# Patient Record
Sex: FEMALE | Race: OTHER | NOT HISPANIC OR LATINO | URBAN - METROPOLITAN AREA
[De-identification: names, ages, dates, MRNs, and addresses within clinical notes are randomized per-mention and may not be internally consistent; named-entity substitution may affect disease eponyms.]

---

## 2023-09-14 PROBLEM — E78.5 HYPERLIPIDEMIA: Status: ACTIVE | Noted: 2023-09-14

## 2023-09-14 PROBLEM — M19.90 IDIOPATHIC OSTEOARTHRITIS: Status: ACTIVE | Noted: 2023-09-14

## 2023-09-14 PROBLEM — K21.9 GASTRO-ESOPHAGEAL REFLUX DISEASE WITHOUT ESOPHAGITIS: Status: ACTIVE | Noted: 2023-09-14

## 2023-09-14 PROBLEM — E78.6 LOW HDL (UNDER 40): Status: ACTIVE | Noted: 2023-09-14

## 2023-09-14 PROBLEM — O99.213 OBESITY COMPLICATING PREGNANCY, THIRD TRIMESTER (HHS-HCC): Status: ACTIVE | Noted: 2023-09-14

## 2023-09-14 PROBLEM — G47.30 SLEEP APNEA: Status: ACTIVE | Noted: 2023-09-14

## 2023-09-14 PROBLEM — I10 ESSENTIAL HYPERTENSION: Status: ACTIVE | Noted: 2023-09-14

## 2023-09-14 PROBLEM — N39.3 URINARY, INCONTINENCE, STRESS FEMALE: Status: ACTIVE | Noted: 2023-09-14

## 2023-09-14 PROBLEM — E55.9 VITAMIN D DEFICIENCY: Status: ACTIVE | Noted: 2023-09-14

## 2023-09-14 PROBLEM — G47.10 HYPERSOMNIA: Status: ACTIVE | Noted: 2023-09-14

## 2023-09-14 PROBLEM — F41.9 ANXIETY: Status: ACTIVE | Noted: 2023-09-14

## 2023-09-14 PROBLEM — E88.810 METABOLIC SYNDROME: Status: ACTIVE | Noted: 2023-09-14

## 2023-09-14 PROBLEM — E66.01 MORBID OBESITY (MULTI): Status: ACTIVE | Noted: 2023-09-14

## 2023-09-14 PROBLEM — N91.2 AMENORRHEA: Status: ACTIVE | Noted: 2023-09-14

## 2023-09-14 PROBLEM — O35.BXX0 ABNORMAL FETAL ECHOCARDIOGRAM AFFECTING ANTEPARTUM CARE OF MOTHER (HHS-HCC): Status: ACTIVE | Noted: 2023-09-14

## 2023-09-14 PROBLEM — N92.6 MENSTRUAL DISORDER: Status: ACTIVE | Noted: 2023-09-14

## 2023-09-14 PROBLEM — Z98.891 HISTORY OF CESAREAN SECTION: Status: ACTIVE | Noted: 2023-09-14

## 2023-09-14 RX ORDER — SEMAGLUTIDE 0.68 MG/ML
0.5 INJECTION, SOLUTION SUBCUTANEOUS
COMMUNITY
Start: 2023-05-02 | End: 2023-11-09 | Stop reason: SDUPTHER

## 2023-09-14 RX ORDER — ERGOCALCIFEROL 1.25 MG/1
50000 CAPSULE ORAL
COMMUNITY
End: 2023-11-09 | Stop reason: WASHOUT

## 2023-09-14 RX ORDER — ONDANSETRON 4 MG/1
4 TABLET, ORALLY DISINTEGRATING ORAL EVERY 6 HOURS PRN
COMMUNITY
End: 2023-11-09 | Stop reason: WASHOUT

## 2023-09-14 RX ORDER — OMEPRAZOLE 40 MG/1
40 CAPSULE, DELAYED RELEASE ORAL DAILY
COMMUNITY
End: 2023-11-09 | Stop reason: WASHOUT

## 2023-09-14 RX ORDER — FLUOXETINE 10 MG/1
1 CAPSULE ORAL DAILY
COMMUNITY
End: 2023-11-09 | Stop reason: WASHOUT

## 2023-10-04 RX ORDER — PHENTERMINE HYDROCHLORIDE 37.5 MG/1
37.5 CAPSULE ORAL DAILY
Qty: 30 CAPSULE | OUTPATIENT
Start: 2023-10-04

## 2023-10-18 ENCOUNTER — HOSPITAL ENCOUNTER (OUTPATIENT)
Facility: HOSPITAL | Age: 33
Setting detail: SURGERY ADMIT
End: 2023-10-18
Attending: SURGERY | Admitting: SURGERY
Payer: COMMERCIAL

## 2023-10-18 DIAGNOSIS — E66.01 OBESITY, MORBID (MULTI): ICD-10-CM

## 2023-10-28 DIAGNOSIS — E66.01 OBESITY, MORBID (MULTI): Primary | ICD-10-CM

## 2023-10-28 DIAGNOSIS — Z76.0 MEDICATION REFILL: ICD-10-CM

## 2023-10-31 RX ORDER — PHENTERMINE HYDROCHLORIDE 37.5 MG/1
37.5 CAPSULE ORAL DAILY
Qty: 30 CAPSULE | Refills: 0 | OUTPATIENT
Start: 2023-10-31

## 2023-11-02 RX ORDER — PHENTERMINE HYDROCHLORIDE 37.5 MG/1
37.5 CAPSULE ORAL
Qty: 7 CAPSULE | Refills: 0 | Status: SHIPPED | OUTPATIENT
Start: 2023-11-02 | End: 2023-11-09 | Stop reason: SDUPTHER

## 2023-11-02 RX ORDER — PHENTERMINE HYDROCHLORIDE 37.5 MG/1
1 CAPSULE ORAL
COMMUNITY
Start: 2023-09-02 | End: 2023-11-02 | Stop reason: SDUPTHER

## 2023-11-03 ENCOUNTER — TELEPHONE (OUTPATIENT)
Dept: PRIMARY CARE | Facility: CLINIC | Age: 33
End: 2023-11-03
Payer: COMMERCIAL

## 2023-11-03 NOTE — TELEPHONE ENCOUNTER
Pt is wondering if on the 11/9th with her appt at 9 if her  can come in at the same time and for both to be seen at same time, His appt is at 4 that day. They did not realize they scheduled their appts for the same day. They figured they ask? Please advise if this is ok or if they should remain separate.

## 2023-11-03 NOTE — TELEPHONE ENCOUNTER
They may both come at 3:45 pm if they prefer (please see if 11:45 appt can be moved to 9 am if this is done is morning was overbooked).  Otherwise would recommend they keep separate appointments so that we have adequate time to address both of their concerns.

## 2023-11-09 ENCOUNTER — PATIENT MESSAGE (OUTPATIENT)
Dept: PRIMARY CARE | Facility: CLINIC | Age: 33
End: 2023-11-09

## 2023-11-09 ENCOUNTER — OFFICE VISIT (OUTPATIENT)
Dept: PRIMARY CARE | Facility: CLINIC | Age: 33
End: 2023-11-09
Payer: COMMERCIAL

## 2023-11-09 VITALS
BODY MASS INDEX: 39.87 KG/M2 | TEMPERATURE: 96.6 F | HEIGHT: 67 IN | DIASTOLIC BLOOD PRESSURE: 80 MMHG | SYSTOLIC BLOOD PRESSURE: 122 MMHG | OXYGEN SATURATION: 99 % | WEIGHT: 254 LBS | HEART RATE: 102 BPM

## 2023-11-09 DIAGNOSIS — E66.01 CLASS 3 SEVERE OBESITY WITHOUT SERIOUS COMORBIDITY WITH BODY MASS INDEX (BMI) OF 40.0 TO 44.9 IN ADULT, UNSPECIFIED OBESITY TYPE (MULTI): Primary | ICD-10-CM

## 2023-11-09 DIAGNOSIS — E66.01 MORBID OBESITY (MULTI): Primary | ICD-10-CM

## 2023-11-09 DIAGNOSIS — E66.01 OBESITY, MORBID (MULTI): ICD-10-CM

## 2023-11-09 DIAGNOSIS — E66.01 CLASS 3 SEVERE OBESITY WITHOUT SERIOUS COMORBIDITY WITH BODY MASS INDEX (BMI) OF 40.0 TO 44.9 IN ADULT, UNSPECIFIED OBESITY TYPE (MULTI): ICD-10-CM

## 2023-11-09 DIAGNOSIS — L70.0 ACNE VULGARIS: ICD-10-CM

## 2023-11-09 DIAGNOSIS — Z76.0 MEDICATION REFILL: ICD-10-CM

## 2023-11-09 PROCEDURE — 3079F DIAST BP 80-89 MM HG: CPT | Performed by: STUDENT IN AN ORGANIZED HEALTH CARE EDUCATION/TRAINING PROGRAM

## 2023-11-09 PROCEDURE — 3074F SYST BP LT 130 MM HG: CPT | Performed by: STUDENT IN AN ORGANIZED HEALTH CARE EDUCATION/TRAINING PROGRAM

## 2023-11-09 PROCEDURE — 99214 OFFICE O/P EST MOD 30 MIN: CPT | Performed by: STUDENT IN AN ORGANIZED HEALTH CARE EDUCATION/TRAINING PROGRAM

## 2023-11-09 PROCEDURE — 3008F BODY MASS INDEX DOCD: CPT | Performed by: STUDENT IN AN ORGANIZED HEALTH CARE EDUCATION/TRAINING PROGRAM

## 2023-11-09 PROCEDURE — 1036F TOBACCO NON-USER: CPT | Performed by: STUDENT IN AN ORGANIZED HEALTH CARE EDUCATION/TRAINING PROGRAM

## 2023-11-09 RX ORDER — PHENTERMINE HYDROCHLORIDE 37.5 MG/1
37.5 CAPSULE ORAL
Qty: 7 CAPSULE | Refills: 0 | Status: SHIPPED | OUTPATIENT
Start: 2023-11-09 | End: 2023-11-10 | Stop reason: SDUPTHER

## 2023-11-09 RX ORDER — SEMAGLUTIDE 0.68 MG/ML
0.5 INJECTION, SOLUTION SUBCUTANEOUS
Qty: 3 ML | Refills: 2 | Status: SHIPPED | OUTPATIENT
Start: 2023-11-09 | End: 2024-01-25 | Stop reason: DRUGHIGH

## 2023-11-09 RX ORDER — CLINDAMYCIN PHOSPHATE 10 MG/G
GEL TOPICAL DAILY
Qty: 60 G | Refills: 5 | Status: SHIPPED | OUTPATIENT
Start: 2023-11-09 | End: 2024-11-08

## 2023-11-09 ASSESSMENT — PATIENT HEALTH QUESTIONNAIRE - PHQ9
1. LITTLE INTEREST OR PLEASURE IN DOING THINGS: NOT AT ALL
SUM OF ALL RESPONSES TO PHQ9 QUESTIONS 1 AND 2: 0
2. FEELING DOWN, DEPRESSED OR HOPELESS: NOT AT ALL

## 2023-11-09 ASSESSMENT — PAIN SCALES - GENERAL: PAINLEVEL: 0-NO PAIN

## 2023-11-09 NOTE — PROGRESS NOTES
Subjective   Sruthi Hoover is a 33 y.o. female who presents for one month f/u.    HPI:        Here for weight management/medication review.  Last seen by me on 9/27/2023, weight 267 lbs at that time.  She continues to take Ozempic 0.5 mg weekly (unable to tolerate higher dose), and phentermine 37.5 mg daily.  Denies any adverse side effects from these medications other than some constipation.    Scheduled for laparoscopic vertical sleeve gastrectomy with Dr. Robin on 12/11/2023.  Is having some anxiety regarding this upcoming procedure.  She questions whether surgical intervention is appropriate.  She has a significant amount of weight that she would still like to lose and feels that surgery will help her reach this goal.    Left foot injury falling down stairs over the weekend.  Had x-ray, no fractures but significant bruising.  Some pain but improving.    Got online rx for clindamycin tx from [email or web address] for acne.  Working well, wonders if I can send in refill of this rx.    Lab Results   Component Value Date    WBC 11.3 (H) 07/05/2023    HGB 12.8 07/05/2023    HCT 39.3 07/05/2023     07/05/2023    CHOL 148 01/20/2023    TRIG 78 01/20/2023    HDL 42 (L) 01/20/2023    ALT 18 07/05/2023    AST 19 07/05/2023     07/05/2023    K 3.5 07/05/2023     07/05/2023    CREATININE 0.7 07/05/2023    BUN 12 07/05/2023    CO2 24 07/05/2023    TSH 0.70 01/20/2023    INR 1.0 03/28/2023    HGBA1C 5.4 03/28/2023        OARRS:  No data recorded  I have personally reviewed the OARRS report for Srutih Hoover. I have considered the risks of abuse, dependence, addiction and diversion    Is the patient prescribed a combination of a benzodiazepine and opioid?  No    Last Urine Drug Screen / ordered today: No - not indicated for phentermine rx  No results found for this or any previous visit (from the past 8760 hour(s)).  N/A    Controlled Substance Agreement:  Date of the Last Agreement: 11/09/23  Reviewed Controlled  "Substance Agreement including but not limited to the benefits, risks, and alternatives to treatment with a Controlled Substance medication(s).    Anorexiants:   What is the patient's goal of therapy? Weight loss  Is this being achieved with current treatment? yes      Patient has demonstrated continued efforts to lose weight, is dedicated to the treatment program and the response to treatment. and I have assessed for the presence or absence of contraindications, adverse effects, and indicators of possible substance abuse that would necessitate cessation of treatment utilizing controlled substance.    Activities of Daily Living:   Is your overall impression that this patient is benefiting (symptom reduction outweighs side effects) from anorexiants therapy? Yes     1. Physical Functioning: Better  2. Family Relationship: Better  3. Social Relationship: Better  4. Mood: Better  5. Sleep Patterns: Better  6. Overall Function: Better        Immunization History   Administered Date(s) Administered    Influenza, injectable, MDCK, preservative free, quadrivalent 11/18/2018    Influenza, injectable, quadrivalent 10/26/2018, 09/17/2019, 01/20/2023    Pfizer Purple Cap SARS-CoV-2 02/26/2021, 03/26/2021, 12/13/2021         ROS:   Review of systems is essentially negative for all systems except for any identified issues in HPI above.    Objective     /80   Pulse 102   Temp 35.9 °C (96.6 °F)   Ht 1.695 m (5' 6.75\")   Wt 115 kg (254 lb)   SpO2 99%   BMI 40.08 kg/m²      PHYSICAL EXAM    GENERAL  Well-appearing, pleasant and cooperative.  No acute distress.    HEENT  HEAD:   Normocephalic.  Atraumatic.  EYES:  PERRLA.  No scleral icterus or conjunctival injection.  EARS:  Tympanic membranes visualized bilaterally without erythema, fluid, or bulging.  NECK:  No adenopathy.  No palpable thyroid enlargement or nodules.    THROAT:  Moist oropharynx without tonsillar enlargement or exudates.    LUNGS:    Clear to " auscultation bilaterally.  No wheezes, rales, rhonchi.    CARDIAC:  Regular rate and rhythm.  Normal S1S2.  No murmurs/rubs/gallops.    ABDOMEN:  Soft, non-tender, non-distended.  No hepatosplenomegaly.  Normoactive bowel sounds.    MUSCULOSKELETAL:  No gross abnormalities.   No joint swelling or erythema,.  No spinal or paraspinal tenderness to palpation.    EXTREMITIES:  R foot in short walking boot. No LE edema or cyanosis.      NEURO           Alert and oriented x3. No focal deficits.    PSYCH:          Affect appropriate.           Assessment/Plan   Problem List Items Addressed This Visit          Endocrine/Metabolic    Obesity, morbid (CMS/HCC)    Relevant Medications    phentermine 37.5 mg capsule     Other Visit Diagnoses       Class 3 severe obesity without serious comorbidity with body mass index (BMI) of 40.0 to 44.9 in adult, unspecified obesity type (CMS/HCC)    -  Primary    Relevant Medications    semaglutide (Ozempic) 0.25 mg or 0.5 mg (2 mg/3 mL) pen injector    phentermine 37.5 mg capsule    Medication refill        Relevant Medications    phentermine 37.5 mg capsule    Acne vulgaris        Relevant Medications    clindamycin (Cleocin T) 1 % gel               Shirley Villar MD

## 2023-11-09 NOTE — PATIENT INSTRUCTIONS
Thank you for coming to see me today.    Medication refill sent to your pharmacy.    If you decide to proceed with planned surgery, stop Ozempic 1 week prior.

## 2023-11-10 RX ORDER — PHENTERMINE HYDROCHLORIDE 37.5 MG/1
37.5 CAPSULE ORAL
Qty: 30 CAPSULE | Refills: 0 | Status: SHIPPED | OUTPATIENT
Start: 2023-11-14 | End: 2023-12-15 | Stop reason: SDUPTHER

## 2023-11-21 ENCOUNTER — CLINICAL SUPPORT (OUTPATIENT)
Dept: SURGERY | Facility: CLINIC | Age: 33
End: 2023-11-21
Payer: COMMERCIAL

## 2023-11-21 ENCOUNTER — OFFICE VISIT (OUTPATIENT)
Dept: SURGERY | Facility: CLINIC | Age: 33
End: 2023-11-21
Payer: COMMERCIAL

## 2023-11-21 VITALS
HEART RATE: 97 BPM | DIASTOLIC BLOOD PRESSURE: 92 MMHG | BODY MASS INDEX: 40.49 KG/M2 | HEIGHT: 67 IN | RESPIRATION RATE: 16 BRPM | SYSTOLIC BLOOD PRESSURE: 149 MMHG | WEIGHT: 258 LBS

## 2023-11-21 VITALS — WEIGHT: 258 LBS | BODY MASS INDEX: 40.71 KG/M2

## 2023-11-21 DIAGNOSIS — I10 PRIMARY HYPERTENSION: ICD-10-CM

## 2023-11-21 DIAGNOSIS — Z01.818 PREOP EXAMINATION: ICD-10-CM

## 2023-11-21 DIAGNOSIS — Z71.9 ENCOUNTER FOR EDUCATION: Primary | ICD-10-CM

## 2023-11-21 DIAGNOSIS — F32.A DEPRESSION, UNSPECIFIED DEPRESSION TYPE: ICD-10-CM

## 2023-11-21 PROCEDURE — 99024 POSTOP FOLLOW-UP VISIT: CPT

## 2023-11-21 PROCEDURE — 3077F SYST BP >= 140 MM HG: CPT | Performed by: INTERNAL MEDICINE

## 2023-11-21 PROCEDURE — 3008F BODY MASS INDEX DOCD: CPT | Performed by: INTERNAL MEDICINE

## 2023-11-21 PROCEDURE — 3008F BODY MASS INDEX DOCD: CPT

## 2023-11-21 PROCEDURE — 3080F DIAST BP >= 90 MM HG: CPT | Performed by: INTERNAL MEDICINE

## 2023-11-21 PROCEDURE — 99213 OFFICE O/P EST LOW 20 MIN: CPT | Performed by: INTERNAL MEDICINE

## 2023-11-21 PROCEDURE — 1036F TOBACCO NON-USER: CPT

## 2023-11-21 PROCEDURE — 1036F TOBACCO NON-USER: CPT | Performed by: INTERNAL MEDICINE

## 2023-11-21 RX ORDER — BUPROPION HYDROCHLORIDE 100 MG/1
100 TABLET ORAL DAILY
COMMUNITY
Start: 2023-11-16

## 2023-11-21 ASSESSMENT — ENCOUNTER SYMPTOMS
PALPITATIONS: 0
DEPRESSION: 0
CONSTIPATION: 0
ARTHRALGIAS: 0
DIZZINESS: 0
COUGH: 0
LOSS OF SENSATION IN FEET: 0
OCCASIONAL FEELINGS OF UNSTEADINESS: 0
NAUSEA: 0
ABDOMINAL PAIN: 0
SHORTNESS OF BREATH: 0
DIARRHEA: 0

## 2023-11-21 ASSESSMENT — PAIN SCALES - GENERAL: PAINLEVEL: 2

## 2023-11-21 ASSESSMENT — PATIENT HEALTH QUESTIONNAIRE - PHQ9
SUM OF ALL RESPONSES TO PHQ9 QUESTIONS 1 AND 2: 0
1. LITTLE INTEREST OR PLEASURE IN DOING THINGS: NOT AT ALL
2. FEELING DOWN, DEPRESSED OR HOPELESS: NOT AT ALL

## 2023-11-21 NOTE — PROGRESS NOTES
"Subjective   Patient ID: Sruthi Hoover is a 33 y.o. female who presents for preoperative clearance for gastric sleeve. Patient is still deciding on surgery as she has lost over 100 lbs on her own. Currently has 3 meals a day, protein with each meal. Occ snacks on yogurt, or cheese and vegetables. Drinks water and protein shakes, occ diet coke. Regarding exercise, she lifts weights.    Diagnostics Reviewed: EKG NSR  Labs Reviewed: 3/2023 labs WNL         Review of Systems   Respiratory:  Negative for cough and shortness of breath.    Cardiovascular:  Negative for chest pain and palpitations.   Gastrointestinal:  Negative for abdominal pain, constipation, diarrhea and nausea.   Musculoskeletal:  Negative for arthralgias.   Neurological:  Negative for dizziness.       Objective   BP (!) 149/92   Pulse 97   Resp 16   Ht 1.695 m (5' 6.75\")   Wt 117 kg (258 lb)   LMP 11/18/2023   BMI 40.71 kg/m²     Physical Exam  Constitutional:       Appearance: She is obese.   HENT:      Mouth/Throat:      Comments: Dentition ok  Cardiovascular:      Rate and Rhythm: Normal rate and regular rhythm.   Pulmonary:      Breath sounds: Normal breath sounds.   Abdominal:      General: Bowel sounds are normal.      Palpations: Abdomen is soft.   Musculoskeletal:         General: No swelling.   Neurological:      Mental Status: She is alert.         Assessment/Plan   Diagnoses and all orders for this visit:  Preop examination  -     Comprehensive Metabolic Panel; Future  -     CBC and Auto Differential; Future  Primary hypertension  Depression, unspecified depression type  -     Comprehensive Metabolic Panel; Future  -     CBC and Auto Differential; Future      Scribe Attestation  By signing my name below, IYumiko Scribe   attest that this documentation has been prepared under the direction and in the presence of Joyce Lr MD.  "

## 2023-11-21 NOTE — PROGRESS NOTES
Pre-Operative Dietary Education     Current Weight: 258 lb  Current BMI: 40.71    In class settings, reviewed thin liquids diet that patient will be required to follow for 2 weeks after surgery. Reviewed low calorie fluids along with protein shakes and products that can be consumed. Emphasized the importance of following diet guidelines and recommendations after surgery to prevent complications. Addressed liquids and items to avoid at this time. Patients are educated to drink at least 50 oz of fluid per day, and gradually take in 50g protein per day. Briefly reviewed the diet progression for the next 3-4 months, which will also be discussed at each follow up appointment after surgery. Also reviewed supplementation after bariatric surgery. Patient was instructed to take chewable MVI with iron once daily for the first 6 weeks after surgery. Other supplementation will be introduced at 6 week follow up appointment.         Noemi Gonzalez RD, LDN  Registered Dietitian, Licensed Dietitian Nutritionist

## 2023-11-21 NOTE — PATIENT INSTRUCTIONS
Stop Ozempic one week before surgery. Day before surgery can take Phentermine and Wellbutrin. Hold all medications morning of surgery. She is medically clear for surgery pending lab results

## 2023-12-13 ENCOUNTER — PATIENT MESSAGE (OUTPATIENT)
Dept: PRIMARY CARE | Facility: CLINIC | Age: 33
End: 2023-12-13
Payer: COMMERCIAL

## 2023-12-13 DIAGNOSIS — Z76.0 MEDICATION REFILL: ICD-10-CM

## 2023-12-13 DIAGNOSIS — E66.01 CLASS 3 SEVERE OBESITY WITHOUT SERIOUS COMORBIDITY WITH BODY MASS INDEX (BMI) OF 40.0 TO 44.9 IN ADULT, UNSPECIFIED OBESITY TYPE (MULTI): ICD-10-CM

## 2023-12-13 DIAGNOSIS — E66.01 OBESITY, MORBID (MULTI): ICD-10-CM

## 2023-12-15 RX ORDER — PHENTERMINE HYDROCHLORIDE 37.5 MG/1
37.5 CAPSULE ORAL
Qty: 7 CAPSULE | Refills: 0 | Status: SHIPPED | OUTPATIENT
Start: 2023-12-15 | End: 2023-12-15 | Stop reason: SDUPTHER

## 2023-12-15 RX ORDER — PHENTERMINE HYDROCHLORIDE 37.5 MG/1
37.5 CAPSULE ORAL
Qty: 7 CAPSULE | Refills: 0 | Status: SHIPPED | OUTPATIENT
Start: 2023-12-15 | End: 2023-12-18 | Stop reason: SDUPTHER

## 2023-12-18 RX ORDER — PHENTERMINE HYDROCHLORIDE 37.5 MG/1
37.5 CAPSULE ORAL
Qty: 7 CAPSULE | Refills: 0 | Status: SHIPPED | OUTPATIENT
Start: 2023-12-18 | End: 2023-12-21 | Stop reason: SDUPTHER

## 2023-12-21 ENCOUNTER — OFFICE VISIT (OUTPATIENT)
Dept: PRIMARY CARE | Facility: CLINIC | Age: 33
End: 2023-12-21
Payer: COMMERCIAL

## 2023-12-21 VITALS
HEIGHT: 66 IN | OXYGEN SATURATION: 99 % | BODY MASS INDEX: 39.37 KG/M2 | SYSTOLIC BLOOD PRESSURE: 124 MMHG | HEART RATE: 90 BPM | DIASTOLIC BLOOD PRESSURE: 84 MMHG | WEIGHT: 245 LBS

## 2023-12-21 DIAGNOSIS — E66.01 CLASS 3 SEVERE OBESITY WITHOUT SERIOUS COMORBIDITY WITH BODY MASS INDEX (BMI) OF 40.0 TO 44.9 IN ADULT, UNSPECIFIED OBESITY TYPE (MULTI): ICD-10-CM

## 2023-12-21 DIAGNOSIS — Z76.0 MEDICATION REFILL: ICD-10-CM

## 2023-12-21 DIAGNOSIS — E66.01 MORBID OBESITY (MULTI): ICD-10-CM

## 2023-12-21 DIAGNOSIS — E66.01 OBESITY, MORBID (MULTI): ICD-10-CM

## 2023-12-21 PROCEDURE — 1036F TOBACCO NON-USER: CPT | Performed by: STUDENT IN AN ORGANIZED HEALTH CARE EDUCATION/TRAINING PROGRAM

## 2023-12-21 PROCEDURE — 99214 OFFICE O/P EST MOD 30 MIN: CPT | Performed by: STUDENT IN AN ORGANIZED HEALTH CARE EDUCATION/TRAINING PROGRAM

## 2023-12-21 PROCEDURE — 3008F BODY MASS INDEX DOCD: CPT | Performed by: STUDENT IN AN ORGANIZED HEALTH CARE EDUCATION/TRAINING PROGRAM

## 2023-12-21 PROCEDURE — 3074F SYST BP LT 130 MM HG: CPT | Performed by: STUDENT IN AN ORGANIZED HEALTH CARE EDUCATION/TRAINING PROGRAM

## 2023-12-21 PROCEDURE — 3079F DIAST BP 80-89 MM HG: CPT | Performed by: STUDENT IN AN ORGANIZED HEALTH CARE EDUCATION/TRAINING PROGRAM

## 2023-12-21 RX ORDER — PHENTERMINE HYDROCHLORIDE 37.5 MG/1
37.5 CAPSULE ORAL
Qty: 30 CAPSULE | Refills: 0 | Status: SHIPPED | OUTPATIENT
Start: 2023-12-24 | End: 2024-01-25 | Stop reason: SDUPTHER

## 2023-12-21 ASSESSMENT — PATIENT HEALTH QUESTIONNAIRE - PHQ9
2. FEELING DOWN, DEPRESSED OR HOPELESS: NOT AT ALL
1. LITTLE INTEREST OR PLEASURE IN DOING THINGS: NOT AT ALL
SUM OF ALL RESPONSES TO PHQ9 QUESTIONS 1 AND 2: 0

## 2023-12-21 ASSESSMENT — PAIN SCALES - GENERAL: PAINLEVEL: 0-NO PAIN

## 2023-12-21 NOTE — PATIENT INSTRUCTIONS
Thank you for coming to see me today.    Ozempic dose increased to 1 mg weekly.  [facility] prescription [facility] -recommend that you start this increased dose after the holidays as we discussed.  Continue phentermine daily, prescription sent today, can fill on 12/24/2023 or after.    Follow-up with me in 6 weeks, sooner if needed.

## 2023-12-21 NOTE — PROGRESS NOTES
Subjective   Sruthi Hoover is a 33 y.o. female who presents for f/u ozempic.    HPI:      Presenting for weight management follow-up.  Last seen by me for this issue on 11/9/2023.    She was scheduled for laparoscopic vertical sleeve gastrectomy on 12/11/2023 which she ultimately decided to cancel.  She will instead continue weight loss with nonsurgical medical management and lifestyle interventions.    Weight 11/9/2023:  254 lb, today 245 lb. since she opted to not pursue surgical intervention, she would like to be more aggressive on medication management.  We had initially had her on 1 mg weekly dose of the Ozempic but she was unable to tolerate due to GI side effects.  She is open to trying the higher dose again since she has been doing well with the 0.5 mg weekly and her body has had longer to acclimate to the medication.  She continues to do well with the phentermine - no chest pain/pressure/palpitations, no insomnia or other concerns.     OARRS:  Shirley Villar MD on 12/21/2023  7:52 AM  I have personally reviewed the OARRS report for Sruthi Hoover. I have considered the risks of abuse, dependence, addiction and diversion and I believe that it is clinically appropriate for Sruthi Hoover to be prescribed this medication    Is the patient prescribed a combination of a benzodiazepine and opioid?  No    Last Urine Drug Screen / ordered today: No - not indicated  No results found for this or any previous visit (from the past 8760 hour(s)).  N/A    Controlled Substance Agreement:  Date of the Last Agreement: 11/9/2023  Reviewed Controlled Substance Agreement including but not limited to the benefits, risks, and alternatives to treatment with a Controlled Substance medication(s).    Anorexiants:   What is the patient's goal of therapy? Weight loss  Is this being achieved with current treatment? yes    Patient has demonstrated continued efforts to lose weight, is dedicated to the treatment program and  "the response to treatment. and I have assessed for the presence or absence of contraindications, adverse effects, and indicators of possible substance abuse that would necessitate cessation of treatment utilizing controlled substance.    Activities of Daily Living:   Is your overall impression that this patient is benefiting (symptom reduction outweighs side effects) from anorexiants therapy? Yes     1. Physical Functioning: Better  2. Family Relationship: Better  3. Social Relationship: Better  4. Mood: Better  5. Sleep Patterns: Same  6. Overall Function: Better        ROS:   Review of systems is essentially negative for all systems except for any identified issues in HPI above.    Objective     /84   Pulse 90   Ht 1.676 m (5' 6\")   Wt 111 kg (245 lb)   LMP 11/18/2023   SpO2 99%   BMI 39.54 kg/m²      PHYSICAL EXAM    GENERAL  Well-appearing, pleasant and cooperative.  No acute distress.    HEENT  HEAD:   Normocephalic.  Atraumatic.  EYES:  PERRLA.  No scleral icterus or conjunctival injection.  NECK:  No adenopathy.  No palpable thyroid enlargement or nodules.    THROAT:  Moist oropharynx without tonsillar enlargement or exudates.    LUNGS:    Clear to auscultation bilaterally.  No wheezes, rales, rhonchi.    CARDIAC:  Regular rate and rhythm.  Normal S1S2.  No murmurs/rubs/gallops.    ABDOMEN:  Soft, non-tender, non-distended.  No hepatosplenomegaly.  Normoactive bowel sounds.    MUSCULOSKELETAL:  No gross abnormalities.   No joint swelling or erythema,.      EXTREMITIES:  No LE edema or cyanosis.      NEURO           Alert and oriented x3. No focal deficits.    PSYCH:          Affect appropriate.           Assessment/Plan   Problem List Items Addressed This Visit    None           Shirley Villar MD    "

## 2023-12-22 ENCOUNTER — APPOINTMENT (OUTPATIENT)
Dept: SURGERY | Facility: CLINIC | Age: 33
End: 2023-12-22
Payer: COMMERCIAL

## 2024-01-04 ENCOUNTER — APPOINTMENT (OUTPATIENT)
Dept: SURGERY | Facility: CLINIC | Age: 34
End: 2024-01-04
Payer: COMMERCIAL

## 2024-01-19 ENCOUNTER — APPOINTMENT (OUTPATIENT)
Dept: SURGERY | Facility: CLINIC | Age: 34
End: 2024-01-19
Payer: COMMERCIAL

## 2024-01-23 ENCOUNTER — PATIENT MESSAGE (OUTPATIENT)
Dept: PRIMARY CARE | Facility: CLINIC | Age: 34
End: 2024-01-23
Payer: COMMERCIAL

## 2024-01-23 DIAGNOSIS — Z76.0 MEDICATION REFILL: ICD-10-CM

## 2024-01-23 DIAGNOSIS — E66.01 CLASS 3 SEVERE OBESITY WITHOUT SERIOUS COMORBIDITY WITH BODY MASS INDEX (BMI) OF 40.0 TO 44.9 IN ADULT, UNSPECIFIED OBESITY TYPE (MULTI): ICD-10-CM

## 2024-01-23 DIAGNOSIS — E66.01 OBESITY, MORBID (MULTI): ICD-10-CM

## 2024-01-25 RX ORDER — PHENTERMINE HYDROCHLORIDE 37.5 MG/1
37.5 CAPSULE ORAL
Qty: 30 CAPSULE | Refills: 0 | Status: SHIPPED | OUTPATIENT
Start: 2024-01-25 | End: 2024-03-05 | Stop reason: SDUPTHER

## 2024-02-02 ENCOUNTER — APPOINTMENT (OUTPATIENT)
Dept: PRIMARY CARE | Facility: CLINIC | Age: 34
End: 2024-02-02
Payer: COMMERCIAL

## 2024-02-09 ENCOUNTER — APPOINTMENT (OUTPATIENT)
Dept: PRIMARY CARE | Facility: CLINIC | Age: 34
End: 2024-02-09
Payer: COMMERCIAL

## 2024-03-05 ENCOUNTER — OFFICE VISIT (OUTPATIENT)
Dept: PRIMARY CARE | Facility: CLINIC | Age: 34
End: 2024-03-05
Payer: COMMERCIAL

## 2024-03-05 VITALS
TEMPERATURE: 97 F | WEIGHT: 233 LBS | SYSTOLIC BLOOD PRESSURE: 112 MMHG | OXYGEN SATURATION: 100 % | HEART RATE: 92 BPM | DIASTOLIC BLOOD PRESSURE: 80 MMHG | BODY MASS INDEX: 37.45 KG/M2 | HEIGHT: 66 IN

## 2024-03-05 DIAGNOSIS — Z76.0 MEDICATION REFILL: ICD-10-CM

## 2024-03-05 DIAGNOSIS — E66.01 CLASS 3 SEVERE OBESITY WITHOUT SERIOUS COMORBIDITY WITH BODY MASS INDEX (BMI) OF 40.0 TO 44.9 IN ADULT, UNSPECIFIED OBESITY TYPE (MULTI): ICD-10-CM

## 2024-03-05 DIAGNOSIS — E66.01 OBESITY, MORBID (MULTI): ICD-10-CM

## 2024-03-05 DIAGNOSIS — E66.9 CLASS 2 OBESITY WITH BODY MASS INDEX (BMI) OF 37.0 TO 37.9 IN ADULT, UNSPECIFIED OBESITY TYPE, UNSPECIFIED WHETHER SERIOUS COMORBIDITY PRESENT: Primary | ICD-10-CM

## 2024-03-05 PROCEDURE — 99214 OFFICE O/P EST MOD 30 MIN: CPT | Performed by: STUDENT IN AN ORGANIZED HEALTH CARE EDUCATION/TRAINING PROGRAM

## 2024-03-05 PROCEDURE — 1036F TOBACCO NON-USER: CPT | Performed by: STUDENT IN AN ORGANIZED HEALTH CARE EDUCATION/TRAINING PROGRAM

## 2024-03-05 PROCEDURE — 3008F BODY MASS INDEX DOCD: CPT | Performed by: STUDENT IN AN ORGANIZED HEALTH CARE EDUCATION/TRAINING PROGRAM

## 2024-03-05 PROCEDURE — 3079F DIAST BP 80-89 MM HG: CPT | Performed by: STUDENT IN AN ORGANIZED HEALTH CARE EDUCATION/TRAINING PROGRAM

## 2024-03-05 PROCEDURE — 3074F SYST BP LT 130 MM HG: CPT | Performed by: STUDENT IN AN ORGANIZED HEALTH CARE EDUCATION/TRAINING PROGRAM

## 2024-03-05 RX ORDER — PHENTERMINE HYDROCHLORIDE 37.5 MG/1
37.5 CAPSULE ORAL
Qty: 30 CAPSULE | Refills: 0 | Status: SHIPPED | OUTPATIENT
Start: 2024-03-05 | End: 2024-03-11 | Stop reason: SDUPTHER

## 2024-03-05 ASSESSMENT — PAIN SCALES - GENERAL: PAINLEVEL: 0-NO PAIN

## 2024-03-05 NOTE — PATIENT INSTRUCTIONS
Thank you for coming to see me today.    Phentermine refill sent to your pharmacy.  Ozempic at current dose of 1 mg weekly.    Congratulations on your continued weight loss progress!    Follow-up with me in 2 months for physical exam and med review, sooner if needed.

## 2024-03-05 NOTE — PROGRESS NOTES
Subjective   Sruthi Hoover is a 33 y.o. female who presents for med review.    HPI:      This is a 33-year-old female presenting for weight management/medication follow-up.  Last seen by me for this issue on 12/21/2023.    Weight Management:  Reports compliance with Ozempic 1 mg weekly and phentermine 37.5 mg daily.  Ozempic dose increased at last visit.  She had previously experienced GI side effects with this dose of Ozempic but felt that she was doing well with 0.5 mg dose and was agreeable to trial of higher dosage again.    Feels like she is tolerating 1 mg dose much better than last time..  Past couple of months she has been eating more with recent holidays.  Thinks that having food in her stomach more consistently is helping her tolerate the medication better.  She has not had any episodes of vomiting since starting this increased dose.    Also continues to do well no chest pain/pressure/palpitations.  Also without headache or vision changes.    Continues to go to the gym regularly, states that she has not missed a day since her last appointment.    Recent weight history:  11/9/2023:  254 lbs  12/21/2023:  245 lbs  3/5/2023:  233 lbs    Has lost 145 lbs total!!!    We discussed her ultimate weight goals, states that she is not sure what this ultimate goal actually is.  We discussed BMI ranges and the limitations of these measurements use of BMI or specific weight targets as goals.  She also expressed interest in potential surgical correction for excess skin once she reaches her goal weight.        Lab Results   Component Value Date    WBC 11.3 (H) 07/05/2023    HGB 12.8 07/05/2023    HCT 39.3 07/05/2023     07/05/2023    CHOL 148 01/20/2023    TRIG 78 01/20/2023    HDL 42 (L) 01/20/2023    ALT 18 07/05/2023    AST 19 07/05/2023     07/05/2023    K 3.5 07/05/2023     07/05/2023    CREATININE 0.7 07/05/2023    BUN 12 07/05/2023    CO2 24 07/05/2023    TSH 0.70 01/20/2023    INR 1.0  "03/28/2023    HGBA1C 5.4 03/28/2023      OARRS:  Shirley Villar MD on 3/5/2024  7:51 AM  I have personally reviewed the OARRS report for Sruthi Hoover. I have considered the risks of abuse, dependence, addiction and diversion and I believe that it is clinically appropriate for Sruthi Hoover to be prescribed this medication    Is the patient prescribed a combination of a benzodiazepine and opioid?  No    Last Urine Drug Screen / ordered today: No  No results found for this or any previous visit (from the past 8760 hour(s)).  N/A    Controlled Substance Agreement:  Date of the Last Agreement: 11/9/2023  Reviewed Controlled Substance Agreement including but not limited to the benefits, risks, and alternatives to treatment with a Controlled Substance medication(s).    Anorexiants:   What is the patient's goal of therapy? Weight management  Is this being achieved with current treatment? Yes    Patient has demonstrated continued efforts to lose weight, is dedicated to the treatment program and the response to treatment. and I have assessed for the presence or absence of contraindications, adverse effects, and indicators of possible substance abuse that would necessitate cessation of treatment utilizing controlled substance.    Activities of Daily Living:   Is your overall impression that this patient is benefiting (symptom reduction outweighs side effects) from anorexiants therapy? Yes     1. Physical Functioning: Better  2. Family Relationship: Better  3. Social Relationship: Better  4. Mood: Better  5. Sleep Patterns: Same  6. Overall Function: Better        ROS:   Review of systems is essentially negative for all systems except for any identified issues in HPI above.    Objective     /80   Pulse 92   Temp 36.1 °C (97 °F)   Ht 1.676 m (5' 6\")   Wt 106 kg (233 lb)   SpO2 100%   BMI 37.61 kg/m²      PHYSICAL EXAM    GENERAL  Well-appearing, pleasant and cooperative.  No acute " distress.    HEENT  HEAD:   Normocephalic.  Atraumatic.  EYES:  PERRLA.  No scleral icterus or conjunctival injection.  NECK:  No adenopathy.        LUNGS:    Clear to auscultation bilaterally.  No wheezes, rales, rhonchi.    CARDIAC:  Regular rate and rhythm.  Normal S1S2.  No murmurs/rubs/gallops.    ABDOMEN:  Soft, non-tender, non-distended.  No hepatosplenomegaly.  Normoactive bowel sounds.    MUSCULOSKELETAL:  No gross abnormalities.   No joint swelling or erythema,.  No spinal or paraspinal tenderness to palpation.    EXTREMITIES:  No LE edema or cyanosis.      NEURO   Alert and oriented x3. No focal deficits.    PSYCH:   Affect appropriate.           Assessment/Plan   Problem List Items Addressed This Visit       Obesity, morbid (CMS/HCC)    Relevant Medications    phentermine 37.5 mg capsule     Other Visit Diagnoses       Class 2 obesity with body mass index (BMI) of 37.0 to 37.9 in adult, unspecified obesity type, unspecified whether serious comorbidity present    -  Primary    Medication refill        Relevant Medications    phentermine 37.5 mg capsule    Class 3 severe obesity without serious comorbidity with body mass index (BMI) of 40.0 to 44.9 in adult, unspecified obesity type (CMS/HCC)        Relevant Medications    phentermine 37.5 mg capsule        Thank you for coming to see me today.    Phentermine refill sent to your pharmacy.  Ozempic at current dose of 1 mg weekly.    Congratulations on your continued weight loss progress!    Follow-up with me in 2 months for physical exam and med review, sooner if needed.         Shirley Villar MD

## 2024-03-09 ENCOUNTER — PATIENT MESSAGE (OUTPATIENT)
Dept: PRIMARY CARE | Facility: CLINIC | Age: 34
End: 2024-03-09
Payer: COMMERCIAL

## 2024-03-09 DIAGNOSIS — Z76.0 MEDICATION REFILL: ICD-10-CM

## 2024-03-09 DIAGNOSIS — E66.01 OBESITY, MORBID (MULTI): ICD-10-CM

## 2024-03-09 DIAGNOSIS — E66.01 CLASS 3 SEVERE OBESITY WITHOUT SERIOUS COMORBIDITY WITH BODY MASS INDEX (BMI) OF 40.0 TO 44.9 IN ADULT, UNSPECIFIED OBESITY TYPE (MULTI): ICD-10-CM

## 2024-03-11 RX ORDER — PHENTERMINE HYDROCHLORIDE 37.5 MG/1
37.5 CAPSULE ORAL
Qty: 30 CAPSULE | Refills: 0 | Status: SHIPPED | OUTPATIENT
Start: 2024-03-11 | End: 2024-04-15 | Stop reason: SDUPTHER

## 2024-05-06 ENCOUNTER — LAB (OUTPATIENT)
Dept: LAB | Facility: LAB | Age: 34
End: 2024-05-06
Payer: COMMERCIAL

## 2024-05-06 ENCOUNTER — OFFICE VISIT (OUTPATIENT)
Dept: PRIMARY CARE | Facility: CLINIC | Age: 34
End: 2024-05-06
Payer: COMMERCIAL

## 2024-05-06 VITALS
DIASTOLIC BLOOD PRESSURE: 80 MMHG | WEIGHT: 219 LBS | HEIGHT: 66 IN | TEMPERATURE: 96.8 F | SYSTOLIC BLOOD PRESSURE: 110 MMHG | HEART RATE: 104 BPM | OXYGEN SATURATION: 99 % | BODY MASS INDEX: 35.2 KG/M2

## 2024-05-06 DIAGNOSIS — Z01.818 PREOP EXAMINATION: ICD-10-CM

## 2024-05-06 DIAGNOSIS — Z00.00 ANNUAL PHYSICAL EXAM: ICD-10-CM

## 2024-05-06 DIAGNOSIS — F32.A DEPRESSION, UNSPECIFIED DEPRESSION TYPE: ICD-10-CM

## 2024-05-06 DIAGNOSIS — Z13.6 SCREENING FOR CARDIOVASCULAR CONDITION: ICD-10-CM

## 2024-05-06 DIAGNOSIS — Z00.00 ANNUAL PHYSICAL EXAM: Primary | ICD-10-CM

## 2024-05-06 DIAGNOSIS — E66.01 OBESITY, MORBID (MULTI): ICD-10-CM

## 2024-05-06 DIAGNOSIS — Z76.0 MEDICATION REFILL: ICD-10-CM

## 2024-05-06 DIAGNOSIS — Z80.3 FAMILY HISTORY OF BREAST CANCER: ICD-10-CM

## 2024-05-06 DIAGNOSIS — E66.9 OBESITY (BMI 30-39.9): ICD-10-CM

## 2024-05-06 DIAGNOSIS — E55.9 VITAMIN D DEFICIENCY: ICD-10-CM

## 2024-05-06 LAB
25(OH)D3 SERPL-MCNC: 40 NG/ML (ref 31–100)
ALBUMIN SERPL-MCNC: 4.5 G/DL (ref 3.5–5)
ALP BLD-CCNC: 90 U/L (ref 35–125)
ALT SERPL-CCNC: 16 U/L (ref 5–40)
ANION GAP SERPL CALC-SCNC: 15 MMOL/L
AST SERPL-CCNC: 19 U/L (ref 5–40)
BASOPHILS # BLD AUTO: 0.05 X10*3/UL (ref 0–0.1)
BASOPHILS NFR BLD AUTO: 0.6 %
BILIRUB SERPL-MCNC: 0.3 MG/DL (ref 0.1–1.2)
BUN SERPL-MCNC: 10 MG/DL (ref 8–25)
CALCIUM SERPL-MCNC: 9.6 MG/DL (ref 8.5–10.4)
CHLORIDE SERPL-SCNC: 104 MMOL/L (ref 97–107)
CHOLEST SERPL-MCNC: 139 MG/DL (ref 133–200)
CHOLEST/HDLC SERPL: 2.5 {RATIO}
CO2 SERPL-SCNC: 23 MMOL/L (ref 24–31)
CREAT SERPL-MCNC: 0.9 MG/DL (ref 0.4–1.6)
EGFRCR SERPLBLD CKD-EPI 2021: 86 ML/MIN/1.73M*2
EOSINOPHIL # BLD AUTO: 0.07 X10*3/UL (ref 0–0.7)
EOSINOPHIL NFR BLD AUTO: 0.9 %
ERYTHROCYTE [DISTWIDTH] IN BLOOD BY AUTOMATED COUNT: 13.2 % (ref 11.5–14.5)
GLUCOSE SERPL-MCNC: 81 MG/DL (ref 65–99)
HCT VFR BLD AUTO: 39.6 % (ref 36–46)
HDLC SERPL-MCNC: 55 MG/DL
HGB BLD-MCNC: 12.8 G/DL (ref 12–16)
IMM GRANULOCYTES # BLD AUTO: 0.02 X10*3/UL (ref 0–0.7)
IMM GRANULOCYTES NFR BLD AUTO: 0.2 % (ref 0–0.9)
LDLC SERPL CALC-MCNC: 74 MG/DL (ref 65–130)
LYMPHOCYTES # BLD AUTO: 2.1 X10*3/UL (ref 1.2–4.8)
LYMPHOCYTES NFR BLD AUTO: 25.7 %
MCH RBC QN AUTO: 29.3 PG (ref 26–34)
MCHC RBC AUTO-ENTMCNC: 32.3 G/DL (ref 32–36)
MCV RBC AUTO: 91 FL (ref 80–100)
MONOCYTES # BLD AUTO: 0.23 X10*3/UL (ref 0.1–1)
MONOCYTES NFR BLD AUTO: 2.8 %
NEUTROPHILS # BLD AUTO: 5.7 X10*3/UL (ref 1.2–7.7)
NEUTROPHILS NFR BLD AUTO: 69.8 %
NRBC BLD-RTO: 0 /100 WBCS (ref 0–0)
PLATELET # BLD AUTO: 317 X10*3/UL (ref 150–450)
POTASSIUM SERPL-SCNC: 4.1 MMOL/L (ref 3.4–5.1)
PROT SERPL-MCNC: 6.9 G/DL (ref 5.9–7.9)
RBC # BLD AUTO: 4.37 X10*6/UL (ref 4–5.2)
SODIUM SERPL-SCNC: 142 MMOL/L (ref 133–145)
TRIGL SERPL-MCNC: 51 MG/DL (ref 40–150)
TSH SERPL DL<=0.05 MIU/L-ACNC: 0.62 MIU/L (ref 0.27–4.2)
WBC # BLD AUTO: 8.2 X10*3/UL (ref 4.4–11.3)

## 2024-05-06 PROCEDURE — 85025 COMPLETE CBC W/AUTO DIFF WBC: CPT

## 2024-05-06 PROCEDURE — 99395 PREV VISIT EST AGE 18-39: CPT | Performed by: STUDENT IN AN ORGANIZED HEALTH CARE EDUCATION/TRAINING PROGRAM

## 2024-05-06 PROCEDURE — 3074F SYST BP LT 130 MM HG: CPT | Performed by: STUDENT IN AN ORGANIZED HEALTH CARE EDUCATION/TRAINING PROGRAM

## 2024-05-06 PROCEDURE — 3079F DIAST BP 80-89 MM HG: CPT | Performed by: STUDENT IN AN ORGANIZED HEALTH CARE EDUCATION/TRAINING PROGRAM

## 2024-05-06 PROCEDURE — 99214 OFFICE O/P EST MOD 30 MIN: CPT | Performed by: STUDENT IN AN ORGANIZED HEALTH CARE EDUCATION/TRAINING PROGRAM

## 2024-05-06 PROCEDURE — 1036F TOBACCO NON-USER: CPT | Performed by: STUDENT IN AN ORGANIZED HEALTH CARE EDUCATION/TRAINING PROGRAM

## 2024-05-06 PROCEDURE — 84443 ASSAY THYROID STIM HORMONE: CPT

## 2024-05-06 PROCEDURE — 3008F BODY MASS INDEX DOCD: CPT | Performed by: STUDENT IN AN ORGANIZED HEALTH CARE EDUCATION/TRAINING PROGRAM

## 2024-05-06 PROCEDURE — 82306 VITAMIN D 25 HYDROXY: CPT

## 2024-05-06 PROCEDURE — 36415 COLL VENOUS BLD VENIPUNCTURE: CPT

## 2024-05-06 PROCEDURE — 80053 COMPREHEN METABOLIC PANEL: CPT

## 2024-05-06 PROCEDURE — 80061 LIPID PANEL: CPT

## 2024-05-06 RX ORDER — PHENTERMINE HYDROCHLORIDE 37.5 MG/1
37.5 CAPSULE ORAL
Qty: 30 CAPSULE | Refills: 0 | Status: SHIPPED | OUTPATIENT
Start: 2024-05-12 | End: 2024-05-22 | Stop reason: SDUPTHER

## 2024-05-06 ASSESSMENT — PROMIS GLOBAL HEALTH SCALE
RATE_AVERAGE_PAIN: 1
RATE_AVERAGE_FATIGUE: MODERATE
RATE_GENERAL_HEALTH: GOOD
CARRYOUT_SOCIAL_ACTIVITIES: VERY GOOD
RATE_QUALITY_OF_LIFE: GOOD
EMOTIONAL_PROBLEMS: SOMETIMES
RATE_SOCIAL_SATISFACTION: GOOD
RATE_PHYSICAL_HEALTH: GOOD
RATE_MENTAL_HEALTH: FAIR
CARRYOUT_PHYSICAL_ACTIVITIES: COMPLETELY

## 2024-05-06 ASSESSMENT — PATIENT HEALTH QUESTIONNAIRE - PHQ9
2. FEELING DOWN, DEPRESSED OR HOPELESS: NOT AT ALL
SUM OF ALL RESPONSES TO PHQ9 QUESTIONS 1 AND 2: 0
1. LITTLE INTEREST OR PLEASURE IN DOING THINGS: NOT AT ALL

## 2024-05-06 ASSESSMENT — PAIN SCALES - GENERAL: PAINLEVEL: 0-NO PAIN

## 2024-05-06 NOTE — PATIENT INSTRUCTIONS
Thank you for coming to see me today.    Go to the lab for fasting blood work, we will call you with all results.    Phentermine and Ozempic refills sent to pharmacy.  Phentermine refilled not due for pickup until 5/12/2024 or after.    Follow-up with me in 2 months, sooner if needed.

## 2024-05-06 NOTE — PROGRESS NOTES
Subjective   Sruthi Hoover is a 33 y.o. female who presents for med folllow up.    HPI:      This is a 33-year-old female presenting for yearly physical.  Last seen by me for weight management visit on 3/5/2024.    PREVENTATIVE HEALTH CARE:    Immunizations:  COVID:  DUE  Flu:  utd  TDaP:  during 2020 pregnancy per pt report  Pneumonia:  not currently indicated    Immunization History   Administered Date(s) Administered    Flu vaccine, quadrivalent, no egg protein, age 6 month or greater (FLUCELVAX) 11/18/2018    Influenza, injectable, quadrivalent 10/26/2018, 09/17/2019, 01/20/2023, 09/27/2023    Pfizer Purple Cap SARS-CoV-2 02/26/2021, 03/26/2021, 12/13/2021       Screenings:  HIV/HCV:  negative x2 1/20/2023  Pap:  11/28/2018 wnl, negative co-HPV testing, pt also reports having one during 2020 pregnancy  Mammogram: Mother and maternal aunt at 61 and 55. ot currently indicated  Colonoscopy: Not currently indicated    Weight Management:  Taking Ozempic 1 mg weekly and phentermine 37.5 mg daily as prescribed.  No adverse side effects.     1/20/2023 2/2/2023 3/2/2023 3/10/2023 4/3/2023 4/25/2023 4/28/2023   Vitals          Weight (lb) 359 (H)  357 (H)  344 (H)  339 (H)  333 (H)  322  321       5/2/2023 5/24/2023 6/6/2023 6/27/2023 6/29/2023 6/30/2023 8/4/2023   Vitals          Weight (lb) 326  311  306.8  296  293  294.2  292       9/1/2023 9/27/2023 11/9/2023 11/21/2023 12/21/2023 3/5/2024   Vitals         Weight (lb) 275  267  254  258  245  233    Weight (lb)    258         5/6/2024   Vitals    Weight (lb) 219       Legend:  (H) High    Lab Results   Component Value Date    WBC 11.3 (H) 07/05/2023    HGB 12.8 07/05/2023    HCT 39.3 07/05/2023     07/05/2023    CHOL 148 01/20/2023    TRIG 78 01/20/2023    HDL 42 (L) 01/20/2023    ALT 18 07/05/2023    AST 19 07/05/2023     07/05/2023    K 3.5 07/05/2023     07/05/2023    CREATININE 0.7 07/05/2023    BUN 12 07/05/2023    CO2 24 07/05/2023    TSH  "0.70 01/20/2023    INR 1.0 03/28/2023    HGBA1C 5.4 03/28/2023      OARRS:  Shirley Villar MD on 5/6/2024  8:15 AM last phentermine refill 4/15/2024  I have personally reviewed the OARRS report for Sruthi Hoover. I have considered the risks of abuse, dependence, addiction and diversion and I believe that it is clinically appropriate for Sruthi Hoover to be prescribed this medication    Is the patient prescribed a combination of a benzodiazepine and opioid?  No    Last Urine Drug Screen / ordered today: No  No results found for this or any previous visit (from the past 8760 hour(s)).  N/A    Controlled Substance Agreement:  Date of the Last Agreement: 11/9/2023  Reviewed Controlled Substance Agreement including but not limited to the benefits, risks, and alternatives to treatment with a Controlled Substance medication(s).    Anorexiants:   What is the patient's goal of therapy? Weight loss/management  Is this being achieved with current treatment? yes    Patient has demonstrated continued efforts to lose weight, is dedicated to the treatment program and the response to treatment. and I have assessed for the presence or absence of contraindications, adverse effects, and indicators of possible substance abuse that would necessitate cessation of treatment utilizing controlled substance.    Activities of Daily Living:   Is your overall impression that this patient is benefiting (symptom reduction outweighs side effects) from anorexiants therapy? Yes     1. Physical Functioning: Better  2. Family Relationship: Better  3. Social Relationship: Better  4. Mood: Better  5. Sleep Patterns: Same  6. Overall Function: Better      ROS:    Review of systems is essentially negative for all systems except for any identified issues in HPI above.    Objective     /80   Pulse 104   Temp 36 °C (96.8 °F)   Ht 1.676 m (5' 6\")   Wt 99.3 kg (219 lb)   SpO2 99%   BMI 35.35 kg/m²      PHYSICAL " EXAM    GENERAL  Well-appearing, pleasant and cooperative.  No acute distress.    HEENT  HEAD:   Normocephalic.  Atraumatic.  EYES:  PERRLA.  No scleral icterus or conjunctival injection.  EARS:  Tympanic membranes visualized bilaterally without erythema, fluid, or bulging.  NECK:  No adenopathy.  No palpable thyroid enlargement or nodules.    THROAT:  Moist oropharynx without tonsillar enlargement or exudates.    LUNGS:    Clear to auscultation bilaterally.  No wheezes, rales, rhonchi.    CARDIAC:  Regular rate and rhythm.  Normal S1S2.  No murmurs/rubs/gallops.    ABDOMEN:  Soft, non-tender, non-distended.  No hepatosplenomegaly.  Normoactive bowel sounds.    MUSCULOSKELETAL:  No gross abnormalities.   No joint swelling or erythema,.  No spinal or paraspinal tenderness to palpation.    EXTREMITIES:  No LE edema or cyanosis.      NEURO           Alert and oriented x3. No focal deficits.    PSYCH:          Affect appropriate.           Assessment/Plan   Problem List Items Addressed This Visit       Vitamin D deficiency    Relevant Orders    Vitamin D 25-Hydroxy,Total (for eval of Vitamin D levels)    Obesity, morbid (Multi)    Relevant Medications    semaglutide (OZEMPIC) 1 mg/dose (4 mg/3 mL) pen injector    phentermine 37.5 mg capsule (Start on 5/12/2024)    Family history of breast cancer     Mother and maternal aunt diagnosed in 60s and 50s respectively.  Will start routine screening mammography at age 38.          Other Visit Diagnoses       Annual physical exam    -  Primary    Relevant Orders    Lipid Panel    CBC    Screening for cardiovascular condition        Relevant Orders    Lipid Panel    Obesity (BMI 30-39.9)        Medications reviewed, patient tolerating well.  No concerning side effects of phentermine or Ozempic.  Will continue current medications at present dosing.    Relevant Medications    semaglutide (OZEMPIC) 1 mg/dose (4 mg/3 mL) pen injector    phentermine 37.5 mg capsule (Start on  5/12/2024)    Other Relevant Orders    Lipid Panel    TSH with reflex to Free T4 if abnormal    Comprehensive Metabolic Panel    Medication refill        Relevant Medications    phentermine 37.5 mg capsule (Start on 5/12/2024)                 Shirley Villar MD

## 2024-05-06 NOTE — ASSESSMENT & PLAN NOTE
Mother and maternal aunt diagnosed in 60s and 50s respectively.  Will start routine screening mammography at age 38.

## 2024-05-22 ENCOUNTER — PATIENT MESSAGE (OUTPATIENT)
Dept: PRIMARY CARE | Facility: CLINIC | Age: 34
End: 2024-05-22
Payer: COMMERCIAL

## 2024-05-22 DIAGNOSIS — Z76.0 MEDICATION REFILL: ICD-10-CM

## 2024-05-22 DIAGNOSIS — E66.9 OBESITY (BMI 30-39.9): ICD-10-CM

## 2024-05-22 DIAGNOSIS — E66.01 OBESITY, MORBID (MULTI): ICD-10-CM

## 2024-05-22 RX ORDER — PHENTERMINE HYDROCHLORIDE 37.5 MG/1
37.5 CAPSULE ORAL
Qty: 30 CAPSULE | Refills: 0 | Status: SHIPPED | OUTPATIENT
Start: 2024-05-22 | End: 2024-06-21

## 2024-05-31 ENCOUNTER — APPOINTMENT (OUTPATIENT)
Dept: PRIMARY CARE | Facility: CLINIC | Age: 34
End: 2024-05-31
Payer: COMMERCIAL

## 2024-06-28 ENCOUNTER — PATIENT MESSAGE (OUTPATIENT)
Dept: PRIMARY CARE | Facility: CLINIC | Age: 34
End: 2024-06-28
Payer: COMMERCIAL

## 2024-06-28 ENCOUNTER — OFFICE VISIT (OUTPATIENT)
Dept: PRIMARY CARE | Facility: CLINIC | Age: 34
End: 2024-06-28
Payer: COMMERCIAL

## 2024-06-28 VITALS
SYSTOLIC BLOOD PRESSURE: 120 MMHG | DIASTOLIC BLOOD PRESSURE: 80 MMHG | HEIGHT: 66 IN | WEIGHT: 216 LBS | TEMPERATURE: 97 F | BODY MASS INDEX: 34.72 KG/M2 | HEART RATE: 107 BPM | OXYGEN SATURATION: 99 %

## 2024-06-28 DIAGNOSIS — L65.9 HAIR LOSS: Primary | ICD-10-CM

## 2024-06-28 DIAGNOSIS — E66.01 OBESITY, MORBID (MULTI): ICD-10-CM

## 2024-06-28 DIAGNOSIS — Z76.0 MEDICATION REFILL: ICD-10-CM

## 2024-06-28 DIAGNOSIS — E66.9 OBESITY (BMI 30-39.9): ICD-10-CM

## 2024-06-28 DIAGNOSIS — L70.0 ACNE VULGARIS: ICD-10-CM

## 2024-06-28 PROCEDURE — 3079F DIAST BP 80-89 MM HG: CPT | Performed by: STUDENT IN AN ORGANIZED HEALTH CARE EDUCATION/TRAINING PROGRAM

## 2024-06-28 PROCEDURE — 99214 OFFICE O/P EST MOD 30 MIN: CPT | Performed by: STUDENT IN AN ORGANIZED HEALTH CARE EDUCATION/TRAINING PROGRAM

## 2024-06-28 PROCEDURE — 3008F BODY MASS INDEX DOCD: CPT | Performed by: STUDENT IN AN ORGANIZED HEALTH CARE EDUCATION/TRAINING PROGRAM

## 2024-06-28 PROCEDURE — 3074F SYST BP LT 130 MM HG: CPT | Performed by: STUDENT IN AN ORGANIZED HEALTH CARE EDUCATION/TRAINING PROGRAM

## 2024-06-28 RX ORDER — CLINDAMYCIN PHOSPHATE 10 MG/G
GEL TOPICAL DAILY
Qty: 60 G | Refills: 5 | Status: SHIPPED | OUTPATIENT
Start: 2024-06-28 | End: 2025-06-28

## 2024-06-28 RX ORDER — TOPIRAMATE 50 MG/1
50 TABLET, FILM COATED ORAL DAILY
Qty: 30 TABLET | Refills: 0 | Status: SHIPPED | OUTPATIENT
Start: 2024-06-28 | End: 2024-12-25

## 2024-06-28 RX ORDER — TOPIRAMATE 50 MG/1
50 TABLET, FILM COATED ORAL 2 TIMES DAILY
Qty: 30 TABLET | Refills: 1 | Status: SHIPPED | OUTPATIENT
Start: 2024-06-28 | End: 2024-06-28 | Stop reason: SDUPTHER

## 2024-06-28 RX ORDER — PHENTERMINE HYDROCHLORIDE 37.5 MG/1
37.5 CAPSULE ORAL
Qty: 30 CAPSULE | Refills: 0 | Status: SHIPPED | OUTPATIENT
Start: 2024-06-28 | End: 2024-07-28

## 2024-06-28 RX ORDER — TRETINOIN 0.25 MG/G
CREAM TOPICAL NIGHTLY
Qty: 45 G | Refills: 1 | Status: SHIPPED | OUTPATIENT
Start: 2024-06-28 | End: 2025-06-28

## 2024-06-28 RX ORDER — TRETINOIN 0.25 MG/G
CREAM TOPICAL NIGHTLY
Qty: 45 G | Refills: 1 | Status: SHIPPED | OUTPATIENT
Start: 2024-06-28 | End: 2024-06-28 | Stop reason: SDUPTHER

## 2024-06-28 ASSESSMENT — PATIENT HEALTH QUESTIONNAIRE - PHQ9
1. LITTLE INTEREST OR PLEASURE IN DOING THINGS: NOT AT ALL
2. FEELING DOWN, DEPRESSED OR HOPELESS: NOT AT ALL
SUM OF ALL RESPONSES TO PHQ9 QUESTIONS 1 AND 2: 0

## 2024-06-28 ASSESSMENT — PAIN SCALES - GENERAL: PAINLEVEL: 0-NO PAIN

## 2024-06-28 NOTE — PROGRESS NOTES
Subjective   Sruthimegan Hoover is a 33 y.o. female who presents for med.    HPI:      This is a 33 year old female presenting for weight management follow up.  She was last seen by me on 5/6/2024 for her yearly physical.    Weight Management:  Currently managed medically with Ozempic 1 mg weekly, phentermine 37.5 mg daily. Feels frustrated by lack of progress.Walking or jogging every day.  Tries to stick to 2000 calories per day.      Saw therapist yesterday, working with  her to get out  of bad habits - notices that she was avoiding drinking water because of weight gain.    Going on vacation in July and wants to be able to enjoy her time.  Wants to take a week break from Ozempic during vacation so she can enjoy the foods she normally can't while taking this medication. Plans to not weigh herself until September    Ongoing issues/discomfort from excess skin in her arms and legs in particular that make movement and exercise more difficult.    Acne:  Breakouts and hair loss over the past few months.         1/20/2023 2/2/2023 3/2/2023 3/10/2023 4/3/2023   Vitals        Weight (lb) 359 (H)  357 (H)  344 (H)  339 (H)  333 (H)    BMI 56.23 kg/m2  55.91 kg/m2  53.88 kg/m2  53.49 kg/m2  52.55 kg/m2       4/25/2023 4/28/2023 5/2/2023 5/24/2023 6/6/2023   Vitals        Weight (lb) 322  321  326  311  306.8    BMI 50.81 kg/m2  50.65 kg/m2  51.44 kg/m2  49.08 kg/m2  48.41 kg/m2       6/27/2023 6/29/2023 6/30/2023 8/4/2023 9/1/2023   Vitals        Weight (lb) 296  293  294.2  292  275    BMI 46.71 kg/m2  46.23 kg/m2  46.42 kg/m2  46.08 kg/m2  43.39 kg/m2       9/27/2023 11/9/2023 11/21/2023 12/21/2023 3/5/2024   Vitals        Weight (lb) 267  254  258  245  233    Weight (lb)   258      BMI 42.13 kg/m2  40.08 kg/m2  40.71 kg/m2  39.54 kg/m2  37.61 kg/m2    BMI   40.71 kg/m2         5/6/2024   Vitals    Weight (lb) 219    BMI 35.35 kg/m2       Lab Results   Component Value Date    WBC 8.2 05/06/2024    HGB 12.8 05/06/2024    HCT  39.6 05/06/2024     05/06/2024    CHOL 139 05/06/2024    TRIG 51 05/06/2024    HDL 55.0 05/06/2024    ALT 16 05/06/2024    AST 19 05/06/2024     05/06/2024    K 4.1 05/06/2024     05/06/2024    CREATININE 0.90 05/06/2024    BUN 10 05/06/2024    CO2 23 (L) 05/06/2024    TSH 0.62 05/06/2024    INR 1.0 03/28/2023    HGBA1C 5.4 03/28/2023      OARRS:  Shirley Villar MD on 6/28/2024  8:08 AM - last phentermine fill on 5/22/2024  I have personally reviewed the OARRS report for Sruthi Hoover. I have considered the risks of abuse, dependence, addiction and diversion and I believe that it is clinically appropriate for Sruthi Hoover to be prescribed this medication    Is the patient prescribed a combination of a benzodiazepine and opioid?  No    Last Urine Drug Screen / ordered today: No  No results found for this or any previous visit (from the past 8760 hour(s)).  N/A    Controlled Substance Agreement:  Date of the Last Agreement: 11/9/2023  Reviewed Controlled Substance Agreement including but not limited to the benefits, risks, and alternatives to treatment with a Controlled Substance medication(s).    Anorexiants:   What is the patient's goal of therapy? Weight management  Is this being achieved with current treatment? yes    Patient has demonstrated continued efforts to lose weight, is dedicated to the treatment program and the response to treatment. and I have assessed for the presence or absence of contraindications, adverse effects, and indicators of possible substance abuse that would necessitate cessation of treatment utilizing controlled substance.    Activities of Daily Living:   Is your overall impression that this patient is benefiting (symptom reduction outweighs side effects) from anorexiants therapy? Yes     1. Physical Functioning: Better  2. Family Relationship: Better  3. Social Relationship: Better  4. Mood: Better  5. Sleep Patterns: Same  6. Overall Function:  "Better    ROS:   Review of systems is essentially negative for all systems except for any identified issues in HPI above.    Objective     /80   Pulse 107   Temp 36.1 °C (97 °F)   Ht 1.676 m (5' 6\")   Wt 98 kg (216 lb)   SpO2 99%   BMI 34.86 kg/m²      PHYSICAL EXAM    GENERAL  Well-appearing, pleasant and cooperative.  No acute distress.    HEENT  HEAD:   Normocephalic.  Atraumatic.  EYES:  PERRLA.  No scleral icterus or conjunctival injection.  NECK:  No adenopathy.  No palpable thyroid enlargement or nodules.    THROAT:  Moist oropharynx without tonsillar enlargement or exudates.    LUNGS:    Clear to auscultation bilaterally.  No wheezes, rales, rhonchi.    CARDIAC:  Regular rate and rhythm.  Normal S1S2.  No murmurs/rubs/gallops.    ABDOMEN:  Soft, non-tender, non-distended.  No hepatosplenomegaly.  Normoactive bowel sounds.    MUSCULOSKELETAL:  No gross abnormalities.   No joint swelling or erythema,.  No spinal or paraspinal tenderness to palpation.    EXTREMITIES:  No LE edema or cyanosis.      NEURO           Alert and oriented x3. No focal deficits.    PSYCH:          Affect appropriate.           Assessment/Plan   Problem List Items Addressed This Visit       Obesity, morbid (Multi)     Additing topiramate to current medication regimen.  Advised patient that it is OK to stop Ozempic for week of vacation in July.           Relevant Medications    phentermine 37.5 mg capsule     Other Visit Diagnoses       Hair loss    -  Primary    Likely SE of significant weight loss.  REviewed with patient.  Encouraged balanced nutrition, routine blood work.    Acne vulgaris        Relevant Medications    clindamycin (Cleocin T) 1 % gel    Obesity (BMI 30-39.9)        Relevant Medications    phentermine 37.5 mg capsule    Medication refill        Relevant Medications    phentermine 37.5 mg capsule        Counseling:   Medication education:    Education: The patient is counseled regarding potential side " effects of all new medications.    Understanding:  Patient expressed understanding.    Adherence:  No barriers to adherence identified.           Shirley Villar MD

## 2024-06-28 NOTE — PATIENT INSTRUCTIONS
Thank you for coming to see me today.    Phentermine and clindamycin gel refills sent to your pharmacy.    New prescription for topiramate sent today.  Take every morning with phentermine.    Tretinoin is the new prescription for acne.  Use at night.  Only need a pea size amount for your whole face and start using only two nights weekly and gradually increase nights per week as tolerated.    Follow up with me in early September, sooner if needed.

## 2024-06-29 NOTE — ASSESSMENT & PLAN NOTE
Additing topiramate to current medication regimen.  Advised patient that it is OK to stop Ozempic for week of vacation in July.

## 2024-08-27 ENCOUNTER — PATIENT MESSAGE (OUTPATIENT)
Dept: PRIMARY CARE | Facility: CLINIC | Age: 34
End: 2024-08-27
Payer: COMMERCIAL

## 2024-08-27 DIAGNOSIS — E66.01 OBESITY, MORBID (MULTI): ICD-10-CM

## 2024-08-27 DIAGNOSIS — E66.9 OBESITY (BMI 30-39.9): ICD-10-CM

## 2024-08-27 DIAGNOSIS — Z76.0 MEDICATION REFILL: ICD-10-CM

## 2024-08-28 RX ORDER — PHENTERMINE HYDROCHLORIDE 37.5 MG/1
37.5 CAPSULE ORAL
Qty: 30 CAPSULE | Refills: 0 | Status: SHIPPED | OUTPATIENT
Start: 2024-08-28 | End: 2024-09-27

## 2024-09-13 ENCOUNTER — APPOINTMENT (OUTPATIENT)
Dept: PRIMARY CARE | Facility: CLINIC | Age: 34
End: 2024-09-13
Payer: COMMERCIAL

## 2024-10-03 ENCOUNTER — OFFICE VISIT (OUTPATIENT)
Dept: PRIMARY CARE | Facility: CLINIC | Age: 34
End: 2024-10-03
Payer: COMMERCIAL

## 2024-10-03 VITALS
HEIGHT: 66 IN | DIASTOLIC BLOOD PRESSURE: 82 MMHG | OXYGEN SATURATION: 98 % | BODY MASS INDEX: 36.64 KG/M2 | TEMPERATURE: 97.3 F | SYSTOLIC BLOOD PRESSURE: 128 MMHG | HEART RATE: 83 BPM | WEIGHT: 228 LBS

## 2024-10-03 DIAGNOSIS — E66.9 OBESITY (BMI 30-39.9): ICD-10-CM

## 2024-10-03 DIAGNOSIS — L70.0 ACNE VULGARIS: ICD-10-CM

## 2024-10-03 DIAGNOSIS — Z76.0 MEDICATION REFILL: ICD-10-CM

## 2024-10-03 DIAGNOSIS — E66.01 OBESITY, MORBID (MULTI): ICD-10-CM

## 2024-10-03 PROCEDURE — 3074F SYST BP LT 130 MM HG: CPT | Performed by: STUDENT IN AN ORGANIZED HEALTH CARE EDUCATION/TRAINING PROGRAM

## 2024-10-03 PROCEDURE — 3008F BODY MASS INDEX DOCD: CPT | Performed by: STUDENT IN AN ORGANIZED HEALTH CARE EDUCATION/TRAINING PROGRAM

## 2024-10-03 PROCEDURE — 3079F DIAST BP 80-89 MM HG: CPT | Performed by: STUDENT IN AN ORGANIZED HEALTH CARE EDUCATION/TRAINING PROGRAM

## 2024-10-03 PROCEDURE — 99214 OFFICE O/P EST MOD 30 MIN: CPT | Performed by: STUDENT IN AN ORGANIZED HEALTH CARE EDUCATION/TRAINING PROGRAM

## 2024-10-03 RX ORDER — PHENTERMINE HYDROCHLORIDE 37.5 MG/1
37.5 CAPSULE ORAL
Qty: 30 CAPSULE | Refills: 0 | Status: SHIPPED | OUTPATIENT
Start: 2024-10-03 | End: 2024-11-02

## 2024-10-03 RX ORDER — TRETINOIN 0.25 MG/G
CREAM TOPICAL NIGHTLY
Qty: 45 G | Refills: 1 | Status: SHIPPED | OUTPATIENT
Start: 2024-10-03 | End: 2025-10-03

## 2024-10-03 RX ORDER — CLINDAMYCIN PHOSPHATE 10 MG/G
GEL TOPICAL DAILY
Qty: 60 G | Refills: 5 | Status: SHIPPED | OUTPATIENT
Start: 2024-10-03 | End: 2025-10-03

## 2024-10-03 ASSESSMENT — PAIN SCALES - GENERAL: PAINLEVEL: 0-NO PAIN

## 2024-10-03 NOTE — PATIENT INSTRUCTIONS
Thank you for coming to see me today.    Continue up titration of Ozempic, daily phentermine as prescribed.    Tretinoin refill sent to [facility].    Clindamycin, phentermine, Ozempic refills sent to [facility].    Follow-up in 3 months for weight management/medication review with DR. ROBBY JETER while I am on maternity leave.

## 2024-10-03 NOTE — PROGRESS NOTES
Subjective   Sruthi Hoover is a 34 y.o. female who presents for follow up.    HPI:      This is a 34-year-old female presenting for medication/weight management follow-up.  Last seen by me on 6/28/2024, last CPE on 5/6/2024.    Acne:  Doing well with clindamycin 1% gel and tretinoin 0/025% cream.  Tretinoin new rx started at time of last visit.    Weight Management:  Currently managed with Ozempic 1 mg weekly,  phentermine 37.5 mg and topiramate 50 mg daily.  Took topiramate once and didn't tolerate well.      Travel went to a wedding.  Also several work trips.  Moved to [place].  New job (promotion) .    Planned to only be off Ozempic for one week, felt great off of it.  Started again for 2 weeks, then started not taking it for other work trips.  Felt sick after starting straight     Did 0.25 mg dosing.  Would like to eventually get to higher dose than the 1 mg.  Understandis that this will take several mothsh.     6/30/2023 8/4/2023 9/1/2023 9/27/2023 11/9/2023   Vitals        Weight (lb) 294.2  292  275  267  254    BMI 46.42 kg/m2  46.08 kg/m2  43.39 kg/m2  42.13 kg/m2  40.08 kg/m2       11/21/2023 12/21/2023 3/5/2024 5/6/2024 6/28/2024   Vitals        Weight (lb) 258  245  233  219  216    Weight (lb) 258        BMI 40.71 kg/m2  39.54 kg/m2  37.61 kg/m2  35.35 kg/m2  34.86 kg/m2    BMI 40.71 kg/m2           10/3/2024   Vitals    Weight (lb) 228    BMI 36.8 kg/m2        Lab Results   Component Value Date    WBC 8.2 05/06/2024    HGB 12.8 05/06/2024    HCT 39.6 05/06/2024     05/06/2024    CHOL 139 05/06/2024    TRIG 51 05/06/2024    HDL 55.0 05/06/2024    ALT 16 05/06/2024    AST 19 05/06/2024     05/06/2024    K 4.1 05/06/2024     05/06/2024    CREATININE 0.90 05/06/2024    BUN 10 05/06/2024    CO2 23 (L) 05/06/2024    TSH 0.62 05/06/2024    INR 1.0 03/28/2023    HGBA1C 5.4 03/28/2023      OARRS:  Shirley Villar MD on 10/3/2024  7:56 AM - last phentermine fill on 8/30/2024 #30   I  have personally reviewed the OARRS report for Sruthi Hoover. I have considered the risks of abuse, dependence, addiction and diversion and I believe that it is clinically appropriate for Sruthi Hoover to be prescribed this medication    Is the patient prescribed a combination of a benzodiazepine and opioid?  No    Last Urine Drug Screen / ordered today: No  No results found for this or any previous visit (from the past 8760 hour(s)).  N/A    Controlled Substance Agreement:  Date of the Last Agreement: 11/9/2023  Reviewed Controlled Substance Agreement including but not limited to the benefits, risks, and alternatives to treatment with a Controlled Substance medication(s).    Anorexiants:   What is the patient's goal of therapy? Weight management  Is this being achieved with current treatment? yes    Patient has demonstrated continued efforts to lose weight, is dedicated to the treatment program and the response to treatment. and I have assessed for the presence or absence of contraindications, adverse effects, and indicators of possible substance abuse that would necessitate cessation of treatment utilizing controlled substance.    Activities of Daily Living:   Is your overall impression that this patient is benefiting (symptom reduction outweighs side effects) from anorexiants therapy? Yes     1. Physical Functioning: Better  2. Family Relationship: Better  3. Social Relationship: Better  4. Mood: Better  5. Sleep Patterns: Same  6. Overall Function: Better    Vaccine Counseling:    Immunization History   Administered Date(s) Administered    Flu vaccine, quadrivalent, no egg protein, age 6 month or greater (FLUCELVAX) 11/18/2018    Influenza, injectable, quadrivalent 10/26/2018, 09/17/2019, 01/20/2023, 09/27/2023    Moderna COVID-19 vaccine, 12 years and older (50mcg/0.5mL)(Spikevax) 09/10/2024    Pfizer Purple Cap SARS-CoV-2 02/26/2021, 03/26/2021, 12/13/2021           ROS:   Review of systems is essentially  "negative for all systems except for any identified issues in HPI above.    Objective     /82   Pulse 83   Temp 36.3 °C (97.3 °F)   Ht 1.676 m (5' 6\")   Wt 103 kg (228 lb)   SpO2 98%   BMI 36.80 kg/m²      PHYSICAL EXAM    GENERAL  Well-appearing, pleasant and cooperative.  No acute distress.    HEENT  HEAD:   Normocephalic.  Atraumatic.  EYES:  PERRLA.  No scleral icterus or conjunctival injection.  NECK:  No adenopathy.  No palpable thyroid enlargement or nodules.    THROAT:  Moist oropharynx without tonsillar enlargement or exudates.    LUNGS:    Clear to auscultation bilaterally.  No wheezes, rales, rhonchi.    CARDIAC:  Regular rate and rhythm.  Normal S1S2.  No murmurs/rubs/gallops.    ABDOMEN:  Soft, non-tender, non-distended.     MUSCULOSKELETAL:  No gross abnormalities.       EXTREMITIES:  No LE edema or cyanosis.      NEURO           Alert and oriented x3. No focal deficits.    PSYCH:          Affect appropriate.           Assessment/Plan   Problem List Items Addressed This Visit       Obesity, morbid (Multi)    Relevant Medications    phentermine 37.5 mg capsule    semaglutide (OZEMPIC) 1 mg/dose (4 mg/3 mL) pen injector     Other Visit Diagnoses       Acne vulgaris        Relevant Medications    tretinoin (Retin-A) 0.025 % cream    clindamycin (Cleocin T) 1 % gel    Acne vulgaris        Clindamycin refilled.  New rx for tretinoin sent as well.    Relevant Medications    tretinoin (Retin-A) 0.025 % cream    clindamycin (Cleocin T) 1 % gel    Obesity (BMI 30-39.9)        Relevant Medications    phentermine 37.5 mg capsule    semaglutide (OZEMPIC) 1 mg/dose (4 mg/3 mL) pen injector    Medication refill        Relevant Medications    phentermine 37.5 mg capsule    Obesity (BMI 30-39.9)        Medications reviewed, patient tolerating well.  No concerning side effects of phentermine or Ozempic.  Will continue current medications at present dosing.    Relevant Medications    phentermine 37.5 mg " capsule    semaglutide (OZEMPIC) 1 mg/dose (4 mg/3 mL) pen injector                 Shirley Villar MD     Consent: Written consent was obtained and risks were reviewed including but not limited to scarring, infection, bleeding, scabbing, incomplete removal, nerve damage and allergy to anesthesia.

## 2024-11-15 DIAGNOSIS — E66.9 OBESITY (BMI 30-39.9): ICD-10-CM

## 2024-11-15 DIAGNOSIS — Z76.0 MEDICATION REFILL: ICD-10-CM

## 2024-11-15 DIAGNOSIS — E66.01 OBESITY, MORBID (MULTI): ICD-10-CM

## 2024-11-15 RX ORDER — PHENTERMINE HYDROCHLORIDE 37.5 MG/1
37.5 CAPSULE ORAL
Qty: 30 CAPSULE | Refills: 0 | Status: SHIPPED | OUTPATIENT
Start: 2024-11-15 | End: 2024-12-15

## 2024-11-15 NOTE — TELEPHONE ENCOUNTER
PT requesting refill on RX phentermine-Please send to [facility] in [place]   Bill For Surgical Tray: no Performing Laboratory: 0 Expected Date Of Service: 05/31/2023 Billing Type: [employer]

## 2024-11-18 DIAGNOSIS — E66.01 OBESITY, MORBID (MULTI): ICD-10-CM

## 2024-11-18 DIAGNOSIS — E66.9 OBESITY (BMI 30-39.9): ICD-10-CM

## 2024-11-18 NOTE — TELEPHONE ENCOUNTER
PT stating that RX ozempic sent to wrong pharmacy. Please resend to correct pharmacy-[facility] in [place]. Pharmacy updated in chart.

## 2024-12-18 DIAGNOSIS — Z76.0 MEDICATION REFILL: ICD-10-CM

## 2024-12-18 DIAGNOSIS — L70.0 ACNE VULGARIS: ICD-10-CM

## 2024-12-18 DIAGNOSIS — E66.9 OBESITY (BMI 30-39.9): ICD-10-CM

## 2024-12-18 DIAGNOSIS — E66.01 OBESITY, MORBID (MULTI): ICD-10-CM

## 2024-12-18 RX ORDER — PHENTERMINE HYDROCHLORIDE 37.5 MG/1
37.5 CAPSULE ORAL
Qty: 30 CAPSULE | Refills: 0 | OUTPATIENT
Start: 2024-12-18 | End: 2025-01-17

## 2024-12-18 RX ORDER — TRETINOIN 0.25 MG/G
CREAM TOPICAL NIGHTLY
Qty: 45 G | Refills: 1 | OUTPATIENT
Start: 2024-12-18 | End: 2025-12-18

## 2024-12-18 RX ORDER — PHENTERMINE HYDROCHLORIDE 37.5 MG/1
37.5 CAPSULE ORAL
Qty: 30 CAPSULE | Refills: 0 | Status: SHIPPED | OUTPATIENT
Start: 2024-12-18 | End: 2025-01-17

## 2024-12-18 NOTE — TELEPHONE ENCOUNTER
I do not prescribe phentermine. Please ask Dr. Hale if she is agreeable to refilling for patient. Thank you

## 2025-01-14 ENCOUNTER — TELEPHONE (OUTPATIENT)
Dept: PRIMARY CARE | Facility: CLINIC | Age: 35
End: 2025-01-14

## 2025-01-14 ENCOUNTER — APPOINTMENT (OUTPATIENT)
Dept: PRIMARY CARE | Facility: CLINIC | Age: 35
End: 2025-01-14
Payer: COMMERCIAL

## 2025-01-14 VITALS
DIASTOLIC BLOOD PRESSURE: 76 MMHG | BODY MASS INDEX: 32.18 KG/M2 | HEART RATE: 89 BPM | WEIGHT: 199.4 LBS | TEMPERATURE: 97.7 F | OXYGEN SATURATION: 100 % | SYSTOLIC BLOOD PRESSURE: 118 MMHG

## 2025-01-14 DIAGNOSIS — E66.9 OBESITY (BMI 30-39.9): ICD-10-CM

## 2025-01-14 DIAGNOSIS — L70.0 ACNE VULGARIS: ICD-10-CM

## 2025-01-14 DIAGNOSIS — E88.810 METABOLIC SYNDROME: ICD-10-CM

## 2025-01-14 DIAGNOSIS — B37.2 SKIN YEAST INFECTION: Primary | ICD-10-CM

## 2025-01-14 DIAGNOSIS — Z76.0 MEDICATION REFILL: ICD-10-CM

## 2025-01-14 DIAGNOSIS — B37.2 SKIN YEAST INFECTION: ICD-10-CM

## 2025-01-14 DIAGNOSIS — E66.01 OBESITY, MORBID (MULTI): ICD-10-CM

## 2025-01-14 PROBLEM — O99.213 OBESITY COMPLICATING PREGNANCY, THIRD TRIMESTER (HHS-HCC): Status: RESOLVED | Noted: 2023-09-14 | Resolved: 2025-01-14

## 2025-01-14 PROBLEM — Z98.891 HISTORY OF CESAREAN SECTION: Status: RESOLVED | Noted: 2023-09-14 | Resolved: 2025-01-14

## 2025-01-14 PROBLEM — O35.BXX0 ABNORMAL FETAL ECHOCARDIOGRAM AFFECTING ANTEPARTUM CARE OF MOTHER (HHS-HCC): Status: RESOLVED | Noted: 2023-09-14 | Resolved: 2025-01-14

## 2025-01-14 PROCEDURE — 3078F DIAST BP <80 MM HG: CPT | Performed by: NURSE PRACTITIONER

## 2025-01-14 PROCEDURE — 3074F SYST BP LT 130 MM HG: CPT | Performed by: NURSE PRACTITIONER

## 2025-01-14 PROCEDURE — 99214 OFFICE O/P EST MOD 30 MIN: CPT | Performed by: NURSE PRACTITIONER

## 2025-01-14 PROCEDURE — 1036F TOBACCO NON-USER: CPT | Performed by: NURSE PRACTITIONER

## 2025-01-14 RX ORDER — PHENTERMINE HYDROCHLORIDE 37.5 MG/1
37.5 CAPSULE ORAL
Qty: 30 CAPSULE | Refills: 0 | Status: SHIPPED | OUTPATIENT
Start: 2025-01-14 | End: 2025-02-13

## 2025-01-14 RX ORDER — TRETINOIN 0.25 MG/G
CREAM TOPICAL NIGHTLY
Qty: 45 G | Refills: 1 | Status: SHIPPED | OUTPATIENT
Start: 2025-01-14 | End: 2025-01-16 | Stop reason: SDUPTHER

## 2025-01-14 RX ORDER — NYSTATIN 100000 [USP'U]/G
1 POWDER TOPICAL 2 TIMES DAILY
Qty: 60 G | Refills: 2 | Status: SHIPPED | OUTPATIENT
Start: 2025-01-14 | End: 2025-01-14 | Stop reason: SDUPTHER

## 2025-01-14 RX ORDER — NYSTATIN 100000 [USP'U]/G
1 POWDER TOPICAL 2 TIMES DAILY
Qty: 60 G | Refills: 2 | Status: SHIPPED | OUTPATIENT
Start: 2025-01-14 | End: 2026-01-14

## 2025-01-14 ASSESSMENT — COLUMBIA-SUICIDE SEVERITY RATING SCALE - C-SSRS
1. IN THE PAST MONTH, HAVE YOU WISHED YOU WERE DEAD OR WISHED YOU COULD GO TO SLEEP AND NOT WAKE UP?: NO
6. HAVE YOU EVER DONE ANYTHING, STARTED TO DO ANYTHING, OR PREPARED TO DO ANYTHING TO END YOUR LIFE?: NO
2. HAVE YOU ACTUALLY HAD ANY THOUGHTS OF KILLING YOURSELF?: NO

## 2025-01-14 ASSESSMENT — PAIN SCALES - GENERAL: PAINLEVEL_OUTOF10: 0-NO PAIN

## 2025-01-14 NOTE — PROGRESS NOTES
Subjective   Patient ID: Sruthi Hoover is a 34 y.o. female who presents for Weight Check.    Follow up for weight loss  Taking ozempic 1mg once a week.  Since October has lost 29 pounds  She has lost 179# pounds total  She has been taking between 0.5mg to 1 mg.  1mg gave side effects so she had to lessen slightly.    Does have irriation to inner thighs and excess skin on her abdomen.  She has been using hydrocortisone cream bur not helping.  Rash is red and itchy.  Has been worsening since losing more weight           Review of Systems   All other systems reviewed and are negative.      Objective   /76   Pulse 89   Temp 36.5 °C (97.7 °F)   Wt 90.4 kg (199 lb 6.4 oz)   SpO2 100%   BMI 32.18 kg/m²     Physical Exam  Vitals and nursing note reviewed.   Constitutional:       General: She is not in acute distress.     Appearance: Normal appearance.   HENT:      Head: Normocephalic and atraumatic.      Right Ear: External ear normal.      Left Ear: External ear normal.      Nose: Nose normal.      Mouth/Throat:      Mouth: Mucous membranes are moist.      Pharynx: Oropharynx is clear.   Eyes:      Extraocular Movements: Extraocular movements intact.      Conjunctiva/sclera: Conjunctivae normal.      Pupils: Pupils are equal, round, and reactive to light.   Neck:      Vascular: No carotid bruit.   Cardiovascular:      Rate and Rhythm: Normal rate and regular rhythm.      Pulses: Normal pulses.      Heart sounds: Normal heart sounds.   Pulmonary:      Effort: Pulmonary effort is normal.      Breath sounds: Normal breath sounds.   Musculoskeletal:      Cervical back: Normal range of motion and neck supple.   Lymphadenopathy:      Cervical: No cervical adenopathy.   Skin:     General: Skin is warm and dry.      Capillary Refill: Capillary refill takes less than 2 seconds.      Comments: Yeast infection to abdominal fold   Neurological:      Mental Status: She is alert and oriented to person, place, and time.    Psychiatric:         Mood and Affect: Mood normal.         Behavior: Behavior normal.         Thought Content: Thought content normal.         Judgment: Judgment normal.         Assessment/Plan   Problem List Items Addressed This Visit             ICD-10-CM    Metabolic syndrome E88.810     - eat off the smaller plate (like the salad plate)  - half the plate should be vegetables, 1/4 protein, 1/4 carbs - for lunch and dinner  -  discussed dietary changes including proper protein intake, increase vegetable intake, fruit intake, low carbohydrate intake, and no processed food intake.  - discussed meal prepping    put your fork down between bites  - drink at least 64 oz of water day.  do not consume large amounts of water with your meal  - do not drink sugary drinks such as pop or specialty coffee drinks  -  eat your vegetables and protein first.  Have vegetables at lunch and dinner  - discussed with patient to increase activity 4 days a week preferably 5. Exercise should be done 5 days a week including walking for 20-30 minutes each day. Work up to this goal.  If you have not been exercising then start with a 10-15 minute walk most days of the week -  keep log of calorie intake and food intake and bring with you to next appointment.You can use an radha on your phone such as [employer] fitness pal.  - discussed with patient using activity trackers such as a fit bit. log  activity daily and bring  activity log at next visit  - increase your protein intake - should have at least 5-6 servings of protein per day  - decrease carb intake - discussed carb serving size and to read packages  - limit carb servings to 4 servings a day  - down 29# since October  - continue ozempic once weekly   - continue phentermine   - OARRS reviewed  - contract signed   - follow up in 3 month  - I have personally reviewed the OARRS report for the patient. This report is scanned into the electronic medical record. I have considered the risks of abuse,  dependence, addiction and diversion. I believe that it is clinically appropriate for the patient to be prescribed this medication  -  controlled substance contract reviewed and signed             Relevant Orders    Follow Up In [facility] - PCP - Established    Obesity, morbid (Multi) E66.01     - eat off the smaller plate (like the salad plate)  - half the plate should be vegetables, 1/4 protein, 1/4 carbs - for lunch and dinner  -  discussed dietary changes including proper protein intake, increase vegetable intake, fruit intake, low carbohydrate intake, and no processed food intake.  - discussed meal prepping    put your fork down between bites  - drink at least 64 oz of water day.  do not consume large amounts of water with your meal  - do not drink sugary drinks such as pop or specialty coffee drinks  -  eat your vegetables and protein first.  Have vegetables at lunch and dinner  - discussed with patient to increase activity 4 days a week preferably 5. Exercise should be done 5 days a week including walking for 20-30 minutes each day. Work up to this goal.  If you have not been exercising then start with a 10-15 minute walk most days of the week -  keep log of calorie intake and food intake and bring with you to next appointment.You can use an radha on your phone such as my fitness pal.  - discussed with patient using activity trackers such as a fit bit. log  activity daily and bring  activity log at next visit  - increase your protein intake - should have at least 5-6 servings of protein per day  - decrease carb intake - discussed carb serving size and to read packages  - limit carb servings to 4 servings a day  - down 29# since October  - continue ozempic once weekly   - continue phentermine   - OARRS reviewed  - contract signed   - follow up in 3 month  - I have personally reviewed the OARRS report for the patient. This report is scanned into the electronic medical record. I have considered the  risks of abuse, dependence, addiction and diversion. I believe that it is clinically appropriate for the patient to be prescribed this medication  -  controlled substance contract reviewed and signed         Relevant Medications    phentermine 37.5 mg capsule    semaglutide (OZEMPIC) 1 mg/dose (4 mg/3 mL) pen injector    Acne vulgaris L70.0     Worsened since starting ozempic  Controlled with tretinoin - refilled          Relevant Medications    tretinoin (Retin-A) 0.025 % cream    Skin yeast infection - Primary B37.2     Due to excess skin and losing 179#  Continue to wash and dry area completely  Nystatin sent in          Other Visit Diagnoses         Codes    Obesity (BMI 30-39.9)     E66.9    Relevant Medications    phentermine 37.5 mg capsule    semaglutide (OZEMPIC) 1 mg/dose (4 mg/3 mL) pen injector    Medication refill     Z76.0    Relevant Medications    phentermine 37.5 mg capsule    Obesity (BMI 30-39.9)     E66.9    Medications reviewed, patient tolerating well.  No concerning side effects of phentermine or Ozempic.  Will continue current medications at present dosing.     Relevant Medications    phentermine 37.5 mg capsule    semaglutide (OZEMPIC) 1 mg/dose (4 mg/3 mL) pen injector

## 2025-01-14 NOTE — ASSESSMENT & PLAN NOTE
- eat off the smaller plate (like the salad plate)  - half the plate should be vegetables, 1/4 protein, 1/4 carbs - for lunch and dinner  -  discussed dietary changes including proper protein intake, increase vegetable intake, fruit intake, low carbohydrate intake, and no processed food intake.  - discussed meal prepping    put your fork down between bites  - drink at least 64 oz of water day.  do not consume large amounts of water with your meal  - do not drink sugary drinks such as pop or specialty coffee drinks  -  eat your vegetables and protein first.  Have vegetables at lunch and dinner  - discussed with patient to increase activity 4 days a week preferably 5. Exercise should be done 5 days a week including walking for 20-30 minutes each day. Work up to this goal.  If you have not been exercising then start with a 10-15 minute walk most days of the week -  keep log of calorie intake and food intake and bring with you to next appointment.You can use an radha on your phone such as [employer] pal.  - discussed with patient using activity trackers such as a fit bit. log  activity daily and bring  activity log at next visit  - increase your protein intake - should have at least 5-6 servings of protein per day  - decrease carb intake - discussed carb serving size and to read packages  - limit carb servings to 4 servings a day  - down 29# since October  - continue ozempic once weekly   - continue phentermine   - OARRS reviewed  - contract signed   - follow up in 3 month  - I have personally reviewed the OARRS report for the patient. This report is scanned into the electronic medical record. I have considered the risks of abuse, dependence, addiction and diversion. I believe that it is clinically appropriate for the patient to be prescribed this medication  -  controlled substance contract reviewed and signed

## 2025-01-14 NOTE — PATIENT INSTRUCTIONS
You are doing AMAZING!  I am so very proud of your journey  Continue the ozempic and your exercise routine  Follow up in 3 month

## 2025-01-15 NOTE — ASSESSMENT & PLAN NOTE
- eat off the smaller plate (like the salad plate)  - half the plate should be vegetables, 1/4 protein, 1/4 carbs - for lunch and dinner  -  discussed dietary changes including proper protein intake, increase vegetable intake, fruit intake, low carbohydrate intake, and no processed food intake.  - discussed meal prepping    put your fork down between bites  - drink at least 64 oz of water day.  do not consume large amounts of water with your meal  - do not drink sugary drinks such as pop or specialty coffee drinks  -  eat your vegetables and protein first.  Have vegetables at lunch and dinner  - discussed with patient to increase activity 4 days a week preferably 5. Exercise should be done 5 days a week including walking for 20-30 minutes each day. Work up to this goal.  If you have not been exercising then start with a 10-15 minute walk most days of the week -  keep log of calorie intake and food intake and bring with you to next appointment.You can use an radha on your phone such as [employer] pal.  - discussed with patient using activity trackers such as a fit bit. log  activity daily and bring  activity log at next visit  - increase your protein intake - should have at least 5-6 servings of protein per day  - decrease carb intake - discussed carb serving size and to read packages  - limit carb servings to 4 servings a day  - down 29# since October  - continue ozempic once weekly   - continue phentermine   - OARRS reviewed  - contract signed   - follow up in 3 month  - I have personally reviewed the OARRS report for the patient. This report is scanned into the electronic medical record. I have considered the risks of abuse, dependence, addiction and diversion. I believe that it is clinically appropriate for the patient to be prescribed this medication  -  controlled substance contract reviewed and signed

## 2025-01-16 ENCOUNTER — TELEPHONE (OUTPATIENT)
Dept: PRIMARY CARE | Facility: CLINIC | Age: 35
End: 2025-01-16
Payer: COMMERCIAL

## 2025-01-16 DIAGNOSIS — L70.0 ACNE VULGARIS: ICD-10-CM

## 2025-01-16 RX ORDER — TRETINOIN 0.25 MG/G
CREAM TOPICAL NIGHTLY
Qty: 45 G | Refills: 1 | Status: SHIPPED | OUTPATIENT
Start: 2025-01-16

## 2025-01-16 NOTE — TELEPHONE ENCOUNTER
"Pharmacy calling regarding RX Retin-A 0.025%. States that insurance needs specific grams for application on directions. As written now it states to \"use a pea sized amount\" insurance will only cover RX with specific amount written to use. Please update and resend to [facility] in [place].  "

## 2025-04-07 ENCOUNTER — PATIENT MESSAGE (OUTPATIENT)
Dept: PRIMARY CARE | Facility: CLINIC | Age: 35
End: 2025-04-07
Payer: COMMERCIAL

## 2025-04-07 DIAGNOSIS — E66.01 OBESITY, MORBID (MULTI): ICD-10-CM

## 2025-04-07 DIAGNOSIS — Z76.0 MEDICATION REFILL: ICD-10-CM

## 2025-04-07 DIAGNOSIS — E66.9 OBESITY (BMI 30-39.9): ICD-10-CM

## 2025-04-10 RX ORDER — PHENTERMINE HYDROCHLORIDE 37.5 MG/1
37.5 CAPSULE ORAL
Qty: 5 CAPSULE | Refills: 0 | Status: SHIPPED | OUTPATIENT
Start: 2025-04-10 | End: 2025-05-10

## 2025-04-22 ENCOUNTER — OFFICE VISIT (OUTPATIENT)
Dept: PRIMARY CARE | Facility: CLINIC | Age: 35
End: 2025-04-22
Payer: COMMERCIAL

## 2025-04-22 VITALS
HEART RATE: 93 BPM | DIASTOLIC BLOOD PRESSURE: 82 MMHG | BODY MASS INDEX: 29.51 KG/M2 | SYSTOLIC BLOOD PRESSURE: 138 MMHG | TEMPERATURE: 97.5 F | OXYGEN SATURATION: 99 % | HEIGHT: 67 IN | WEIGHT: 188 LBS

## 2025-04-22 DIAGNOSIS — L70.0 ACNE VULGARIS: ICD-10-CM

## 2025-04-22 DIAGNOSIS — E66.3 OVERWEIGHT: Primary | ICD-10-CM

## 2025-04-22 DIAGNOSIS — E66.9 OBESITY (BMI 30-39.9): ICD-10-CM

## 2025-04-22 DIAGNOSIS — L98.7 EXCESS SKIN: ICD-10-CM

## 2025-04-22 DIAGNOSIS — E66.01 OBESITY, MORBID (MULTI): ICD-10-CM

## 2025-04-22 DIAGNOSIS — Z86.39 HISTORY OF OBESITY: ICD-10-CM

## 2025-04-22 DIAGNOSIS — Z76.0 MEDICATION REFILL: ICD-10-CM

## 2025-04-22 DIAGNOSIS — E88.810 METABOLIC SYNDROME: ICD-10-CM

## 2025-04-22 DIAGNOSIS — Z87.898 HISTORY OF WEIGHT LOSS: ICD-10-CM

## 2025-04-22 PROCEDURE — 99214 OFFICE O/P EST MOD 30 MIN: CPT | Performed by: STUDENT IN AN ORGANIZED HEALTH CARE EDUCATION/TRAINING PROGRAM

## 2025-04-22 PROCEDURE — 3075F SYST BP GE 130 - 139MM HG: CPT | Performed by: STUDENT IN AN ORGANIZED HEALTH CARE EDUCATION/TRAINING PROGRAM

## 2025-04-22 PROCEDURE — 3079F DIAST BP 80-89 MM HG: CPT | Performed by: STUDENT IN AN ORGANIZED HEALTH CARE EDUCATION/TRAINING PROGRAM

## 2025-04-22 PROCEDURE — 3008F BODY MASS INDEX DOCD: CPT | Performed by: STUDENT IN AN ORGANIZED HEALTH CARE EDUCATION/TRAINING PROGRAM

## 2025-04-22 RX ORDER — TRETINOIN 0.5 MG/G
CREAM TOPICAL NIGHTLY
Qty: 20 G | Refills: 5 | Status: SHIPPED | OUTPATIENT
Start: 2025-04-22 | End: 2026-04-22

## 2025-04-22 RX ORDER — PHENTERMINE HYDROCHLORIDE 37.5 MG/1
37.5 CAPSULE ORAL
Qty: 30 CAPSULE | Refills: 0 | Status: SHIPPED | OUTPATIENT
Start: 2025-04-22 | End: 2025-05-22

## 2025-04-22 ASSESSMENT — PATIENT HEALTH QUESTIONNAIRE - PHQ9
SUM OF ALL RESPONSES TO PHQ9 QUESTIONS 1 AND 2: 0
2. FEELING DOWN, DEPRESSED OR HOPELESS: NOT AT ALL
1. LITTLE INTEREST OR PLEASURE IN DOING THINGS: NOT AT ALL

## 2025-04-22 ASSESSMENT — PAIN SCALES - GENERAL: PAINLEVEL_OUTOF10: 0-NO PAIN

## 2025-04-22 NOTE — PROGRESS NOTES
Subjective   Sruthi Hoover is a 34 y.o. female who presents for med follow up.    HPI:      This is a 34-year-old female presenting for weight management/medication follow-up.    Doing well with Ozempic 1 mg weekly and phentermine 37.5 mg daily.      Extra skin making it difficult to run    Using Nystatin powder but still uncomfortable.      Legs and Stomach are most bothersome.  Legs make it harder    Down 171 lbs!!!   1/20/2023 2/2/2023 3/2/2023 3/10/2023 4/3/2023   Vitals        Weight (lb) 359 (H)  357 (H)  344 (H)  339 (H)  333 (H)    BMI 56.23 kg/m2  55.91 kg/m2  53.88 kg/m2  53.49 kg/m2  52.55 kg/m2       4/25/2023 4/28/2023 5/2/2023 5/24/2023 6/6/2023   Vitals        Weight (lb) 322  321  326  311  306.8    BMI 50.81 kg/m2  50.65 kg/m2  51.44 kg/m2  49.08 kg/m2  48.41 kg/m2       6/27/2023 6/29/2023 6/30/2023 8/4/2023 9/1/2023   Vitals        Weight (lb) 296  293  294.2  292  275    BMI 46.71 kg/m2  46.23 kg/m2  46.42 kg/m2  46.08 kg/m2  43.39 kg/m2       9/27/2023 11/9/2023 11/21/2023 12/21/2023 3/5/2024   Vitals        Weight (lb) 267  254  258  245  233    Weight (lb)   258      BMI 42.13 kg/m2  40.08 kg/m2  40.71 kg/m2  39.54 kg/m2  37.61 kg/m2    BMI   40.71 kg/m2         5/6/2024 6/28/2024 10/3/2024 1/14/2025 4/22/2025   Vitals        Weight (lb) 219  216  228  199.4  188    BMI 35.35 kg/m2  34.86 kg/m2  36.8 kg/m2  32.18 kg/m2  29.44 kg/m2       Legend:  (H) High    Lab Results   Component Value Date    WBC 8.2 05/06/2024    HGB 12.8 05/06/2024    HCT 39.6 05/06/2024     05/06/2024    CHOL 139 05/06/2024    TRIG 51 05/06/2024    HDL 55.0 05/06/2024    ALT 16 05/06/2024    AST 19 05/06/2024     05/06/2024    K 4.1 05/06/2024     05/06/2024    CREATININE 0.90 05/06/2024    BUN 10 05/06/2024    CO2 23 (L) 05/06/2024    TSH 0.62 05/06/2024    INR 1.0 03/28/2023    HGBA1C 5.4 03/28/2023        OARRS:  Shirley Villar MD on 4/22/2025 10:39 AM - last phentermine fill on 4/10/2025  "for 5 days   I have personally reviewed the OARRS report for Sruthi Hoover. I have considered the risks of abuse, dependence, addiction and diversion and I believe that it is clinically appropriate for Sruthi Hoover to be prescribed this medication    Is the patient prescribed a combination of a benzodiazepine and opioid?  No    Last Urine Drug Screen / ordered today: No  No results found for this or any previous visit (from the past 8760 hours).  N/A    Controlled Substance Agreement:  Date of the Last Agreement: 1/24/2025 with Mackenzie Landaverde, APRN-CNP on 1/14/2025, 11/9/2023 with me - NELLY  Reviewed Controlled Substance Agreement including but not limited to the benefits, risks, and alternatives to treatment with a Controlled Substance medication(s).    Anorexiants:   What is the patient's goal of therapy? Weight loss/management  Is this being achieved with current treatment? yes    Patient has demonstrated continued efforts to lose weight, is dedicated to the treatment program and the response to treatment. and I have assessed for the presence or absence of contraindications, adverse effects, and indicators of possible substance abuse that would necessitate cessation of treatment utilizing controlled substance.    Activities of Daily Living:   Is your overall impression that this patient is benefiting (symptom reduction outweighs side effects) from anorexiants therapy? Yes     1. Physical Functioning: Better  2. Family Relationship: Better  3. Social Relationship: Better  4. Mood: Better  5. Sleep Patterns: Same  6. Overall Function: Better        ROS:   Review of systems is essentially negative for all systems except for any identified issues in HPI above.    Objective     /82   Pulse 93   Temp 36.4 °C (97.5 °F)   Ht 1.702 m (5' 7\")   Wt 85.3 kg (188 lb)   SpO2 99%   BMI 29.44 kg/m²      PHYSICAL EXAM    GENERAL  Well-appearing, pleasant and cooperative.  No acute " distress.    HEENT  HEAD:   Normocephalic.  Atraumatic.  EYES:  PERRLA.  No scleral icterus or conjunctival injection.  EARS:  Tympanic membranes visualized bilaterally without erythema, fluid, or bulging.  NECK:  No adenopathy.  No palpable thyroid enlargement or nodules.    THROAT:  Moist oropharynx without tonsillar enlargement or exudates.    LUNGS:    Clear to auscultation bilaterally.  No wheezes, rales, rhonchi.    CARDIAC:  Regular rate and rhythm.  Normal S1S2.  No murmurs/rubs/gallops.    ABDOMEN:  Soft, non-tender, non-distended.  No hepatosplenomegaly.  Normoactive bowel sounds.    MUSCULOSKELETAL:  No gross abnormalities.   No joint swelling or erythema,.  No spinal or paraspinal tenderness to palpation.    EXTREMITIES:  No LE edema or cyanosis.      NEURO           Alert and oriented x3. No focal deficits.    PSYCH:          Affect appropriate.           Assessment/Plan   Problem List Items Addressed This Visit    None           Shirley Villar MD     medications.    Understanding:  Patient expressed understanding.    Adherence:  No barriers to adherence identified.    Patient Instructions   Thank you for coming to see me today.    You are doing so wonderfully!!    Medication refills sent to your pharmacy including increase strength of tretinoin.    Plastic surgery referral entered today for consultation regarding excess skin removal.  Call to schedule.    Follow up in 1-3 months for YEARLY PHYSICAL.      Shirley Villar MD

## 2025-04-22 NOTE — PATIENT INSTRUCTIONS
Thank you for coming to see me today.    You are doing so wonderfully!!    Medication refills sent to your pharmacy including increase strength of tretinoin.    Plastic surgery referral entered today for consultation regarding excess skin removal.  Call to schedule.    Follow up in 1-3 months for YEARLY PHYSICAL.

## 2025-04-25 ENCOUNTER — PATIENT MESSAGE (OUTPATIENT)
Dept: PRIMARY CARE | Facility: CLINIC | Age: 35
End: 2025-04-25
Payer: COMMERCIAL

## 2025-04-25 DIAGNOSIS — K04.7 DENTAL INFECTION: Primary | ICD-10-CM

## 2025-04-25 RX ORDER — AMOXICILLIN AND CLAVULANATE POTASSIUM 875; 125 MG/1; MG/1
875 TABLET, FILM COATED ORAL 2 TIMES DAILY
Qty: 14 TABLET | Refills: 0 | Status: SHIPPED | OUTPATIENT
Start: 2025-04-25 | End: 2025-05-02

## 2025-05-23 ENCOUNTER — TELEPHONE (OUTPATIENT)
Dept: PLASTIC SURGERY | Facility: CLINIC | Age: 35
End: 2025-05-23
Payer: COMMERCIAL

## 2025-05-24 ENCOUNTER — PATIENT MESSAGE (OUTPATIENT)
Dept: PRIMARY CARE | Facility: CLINIC | Age: 35
End: 2025-05-24
Payer: COMMERCIAL

## 2025-05-24 DIAGNOSIS — Z86.39 HISTORY OF OBESITY: ICD-10-CM

## 2025-05-24 DIAGNOSIS — E66.3 OVERWEIGHT: ICD-10-CM

## 2025-05-27 ENCOUNTER — TELEPHONE (OUTPATIENT)
Dept: PLASTIC SURGERY | Facility: CLINIC | Age: 35
End: 2025-05-27
Payer: COMMERCIAL

## 2025-05-27 RX ORDER — PHENTERMINE HYDROCHLORIDE 37.5 MG/1
37.5 CAPSULE ORAL
Qty: 30 CAPSULE | Refills: 0 | Status: SHIPPED | OUTPATIENT
Start: 2025-05-27 | End: 2025-06-26

## 2025-05-27 NOTE — TELEPHONE ENCOUNTER
Left voicemail for patient to call our office back to discuss insurance criteria for skin removal surgery.

## 2025-06-04 ENCOUNTER — TELEPHONE (OUTPATIENT)
Dept: PLASTIC SURGERY | Facility: CLINIC | Age: 35
End: 2025-06-04
Payer: COMMERCIAL

## 2025-06-05 ENCOUNTER — TELEPHONE (OUTPATIENT)
Dept: PLASTIC SURGERY | Facility: CLINIC | Age: 35
End: 2025-06-05
Payer: COMMERCIAL

## 2025-06-05 NOTE — TELEPHONE ENCOUNTER
"RN reached out to Sruthi  regarding request for panniculectomy surgery consult. They are agreeable to answering triage questions at this time.    General Health Questions:  Height? 5'7\"  Weight? 192 lbs  BMI? 30.1  Has your weight been stable the past 6 months? Yes-   What did you do to achieve your weight loss goals? Lost 192 lbs work out, on ozempic and phentermine  Do you smoke? No  Do you have any medical conditions like HTN or diabetes? no    Panniculectomy Questions:  Do you have any skin conditions due to the overhanging skin? Yes, rash treated by nystatin  Have you had RX treatment within the past three months? Yes  Does your skin hang at our below your pubic bone? Yes    I reviewed with  the insurance requirements for a panniculectomy prior to patient's appointment with Dr. Washington  "

## 2025-06-05 NOTE — PROGRESS NOTES
Subjective   Patient ID: Sruthi Hoover is a 34 y.o. female presenting as a new patient consult for skin removal.    HPI Sruthi is a 34 year old non-smoking female with past medical history of anxiety, metabolic syndrome, and morbid obesity. The patient has lost around 170 lbs by diet, exercise, GLP-1, and phentermine prescription. She complains of rash under abdominal skin folds that is treated by her PCP and difficulty with movement and exercising due to the extent of excess skin. She has functional She has been a stable weight for 6 months.       Review of Systems  All 10 systems were reviewed and are unremarkable except for those mentioned in HPI.     Objective   Physical Exam  Physical Exam  Vitals and nursing note reviewed. Exam conducted with a chaperone present.     Constitutional:       General: She is not in acute distress.     Appearance: She is not ill-appearing.   Eyes:      Extraocular Movements: Extraocular movements intact.      Conjunctiva/sclera: Conjunctivae normal.      Pupils: Pupils are equal, round, and reactive to light.   Cardiovascular:      Rate and Rhythm: Normal rate and regular rhythm.      Pulses: Normal pulses.   Pulmonary:      Effort: Pulmonary effort is normal.      Breath sounds: Normal breath sounds.   Abdominal:      Palpations: Abdomen is soft. There is no mass.      Tenderness: There is no abdominal tenderness.      Hernia: No hernia is present.   Musculoskeletal:         General: No swelling or tenderness.      Cervical back: Normal range of motion and neck supple.   Skin:     Capillary Refill: Capillary refill takes less than 2 seconds.      Coloration: Skin is not jaundiced.      Findings: No bruising or rash.   Neurological:      General: No focal deficit present.      Mental Status: She is oriented to person, place, and time.   Psychiatric:         Mood and Affect: Mood normal.         Behavior: Behavior normal.         Thought Content: Thought content normal.          Judgment: Judgment normal.     Abdomen: Excess skin which extends lateral tot he ASIS for 20 cm. Over hanging pannus grade III. Excess skin in the supraumbilical region extending to subcostal region bilaterally which result in a second abdominal fold over the umbilicus. Otherwise, the abdomen is soft and pliable, with no masses or hernias.     Thighs :Excess skin at the medial thigh with skin folds, with excessive adiposity at the superior aspect. Excessive adiposity at the lateral thigh without skin folds. Skin folds at the lower thigh/knee.     Arms: Loose hanging skin without severe adiposity.     Assessment/Plan   Sruthi is a very pleasant female patient.  She works as a . She presents with recurrent rash and intertrigo under the folds at her abdomen, thighs, and arms.  Medical treatment has been tried numerous times and failed.  I believe this is directly related to the presence of excess skin at the abdomen with overhanging pannus down to the pubic bone, and excess and saggy skin at the thighs and arms (bat wings).   Patient also complained that the excess skin has been inhibiting her activities of daily living and reducing her ability to work out.  The patient has stable weight for over 6 months, and her BMI is at 30.7.  Given her presentation and failure of medical treatment, surgical removal of excess skin at the abdomen, thighs, and arms is medically necessary.  I anticipate better control of rash and improvement in her daily activities.       Today and based on patient's priority, we focused our consultation on the abdomen. I discussed with patient panniculectomy surgery, its goals, contraindications and indications. The surgical technique was described to patient in detail: There will be horizontally-oriented incision in the area between the pubic hairline and belly button. The length of the incision will extend to both flanks, these were determined by the amount of excess skin.  Additionally, there will be a vertical midline incision because she has excess skin and tissue in the transverse dimension above the umbilicus. An incision will also be made around the navel, or the navel may have to be sacrificed. This is important to allow the removal of supra umbilical excess tissue. Tightening of the abdominal wall (diastasis recti repair) is not routinely performed. The remaining upper abdominal skin is pulled down like a window shade. The excess skin is trimmed and the remaining skin is sutured together. The belly button stem is then repositioned and carefully drawn through the new abdominal opening, then sutured into its revised position.                              I discuss likely outcomes of above described panniculectomy surgery (Fluer De Lis) and any risks or potential complications, including but not limited to anesthesia risks, deep vein thrombosis, cardiac and pulmonary complications, infection, bleeding and hematoma, fatty tissue found deep in the skin might die (fat necrosis), fluid accumulation (seroma), numbness or other changes in skin sensation, persistent pain, poor healing of incisions, recurrent looseness of skin, skin discoloration and/or prolonged swelling, skin loss, asymmetry dog ears and suboptimal aesthetic result, unfavorable scarring, possibility of revisional surgery. We also discussed how complications are handled, and her options if she is dissatisfied with the surgery.              I discussed perioperative course and length of hospital stay (under 23 hours), and recovery phase (she will be off work for 2-4 weeks, and she will need to observe weight restrictions for 6-8 weeks).               The patient demonstrated good understanding of our discussion, all her questions were answered to the best of my knowledge and to patient's satisfaction, and while good results are expected, she understands that surgery is not an exact science, and no guarantees were given.      Plan   -Case request for [id number] (panniculectomy), [id number] (Removal of excess skin above the umbilicus and the mons pubis, [id number] (repositioning of the umbilicus).   - Scheduled photos

## 2025-06-08 DIAGNOSIS — L70.0 ACNE VULGARIS: ICD-10-CM

## 2025-06-09 ENCOUNTER — APPOINTMENT (OUTPATIENT)
Dept: PLASTIC SURGERY | Facility: CLINIC | Age: 35
End: 2025-06-09
Payer: COMMERCIAL

## 2025-06-09 VITALS
HEART RATE: 80 BPM | BODY MASS INDEX: 30.76 KG/M2 | DIASTOLIC BLOOD PRESSURE: 83 MMHG | HEIGHT: 67 IN | SYSTOLIC BLOOD PRESSURE: 130 MMHG | WEIGHT: 196 LBS

## 2025-06-09 DIAGNOSIS — L98.7 EXCESS SKIN OF ABDOMEN: ICD-10-CM

## 2025-06-09 DIAGNOSIS — L30.4 INTERTRIGO: ICD-10-CM

## 2025-06-09 DIAGNOSIS — E65 ABDOMINAL PANNUS: Primary | ICD-10-CM

## 2025-06-09 PROCEDURE — 3008F BODY MASS INDEX DOCD: CPT

## 2025-06-09 PROCEDURE — 1036F TOBACCO NON-USER: CPT

## 2025-06-09 PROCEDURE — 99203 OFFICE O/P NEW LOW 30 MIN: CPT

## 2025-06-09 PROCEDURE — 3075F SYST BP GE 130 - 139MM HG: CPT

## 2025-06-09 PROCEDURE — 3079F DIAST BP 80-89 MM HG: CPT

## 2025-06-09 RX ORDER — CLINDAMYCIN PHOSPHATE 10 MG/G
GEL TOPICAL DAILY
Qty: 60 G | Refills: 0 | Status: SHIPPED | OUTPATIENT
Start: 2025-06-09

## 2025-06-09 NOTE — TELEPHONE ENCOUNTER
Pharmacy requesting refill(s) of the populated rx(s). Pt last seen 4/22/25 and has upcoming appt 7/22/25.

## 2025-06-10 ENCOUNTER — APPOINTMENT (OUTPATIENT)
Dept: PLASTIC SURGERY | Facility: CLINIC | Age: 35
End: 2025-06-10
Payer: COMMERCIAL

## 2025-06-18 PROBLEM — L30.4 INTERTRIGO: Status: ACTIVE | Noted: 2025-06-09

## 2025-06-18 PROBLEM — L98.7 EXCESS SKIN OF ABDOMEN: Status: ACTIVE | Noted: 2025-06-09

## 2025-06-18 PROBLEM — E65 ABDOMINAL PANNUS: Status: ACTIVE | Noted: 2025-06-09

## 2025-06-19 ENCOUNTER — PATIENT MESSAGE (OUTPATIENT)
Dept: PRIMARY CARE | Facility: CLINIC | Age: 35
End: 2025-06-19
Payer: COMMERCIAL

## 2025-06-19 DIAGNOSIS — Z86.39 HISTORY OF OBESITY: ICD-10-CM

## 2025-06-19 DIAGNOSIS — E66.3 OVERWEIGHT: ICD-10-CM

## 2025-06-19 RX ORDER — PHENTERMINE HYDROCHLORIDE 37.5 MG/1
37.5 CAPSULE ORAL
Qty: 30 CAPSULE | Refills: 0 | Status: SHIPPED | OUTPATIENT
Start: 2025-06-24 | End: 2025-07-24

## 2025-07-14 DIAGNOSIS — L70.0 ACNE VULGARIS: ICD-10-CM

## 2025-07-14 RX ORDER — CLINDAMYCIN PHOSPHATE 10 MG/G
GEL TOPICAL DAILY
Qty: 60 G | Refills: 0 | Status: SHIPPED | OUTPATIENT
Start: 2025-07-14

## 2025-07-16 ASSESSMENT — PROMIS GLOBAL HEALTH SCALE
EMOTIONAL_PROBLEMS: SOMETIMES
RATE_PHYSICAL_HEALTH: GOOD
CARRYOUT_PHYSICAL_ACTIVITIES: MODERATELY
RATE_GENERAL_HEALTH: GOOD
RATE_SOCIAL_SATISFACTION: GOOD
RATE_AVERAGE_FATIGUE: MILD
RATE_QUALITY_OF_LIFE: GOOD
RATE_AVERAGE_PAIN: 3
RATE_MENTAL_HEALTH: FAIR
CARRYOUT_SOCIAL_ACTIVITIES: FAIR

## 2025-07-22 ENCOUNTER — APPOINTMENT (OUTPATIENT)
Dept: PRIMARY CARE | Facility: CLINIC | Age: 35
End: 2025-07-22
Payer: COMMERCIAL

## 2025-07-22 VITALS
WEIGHT: 188 LBS | DIASTOLIC BLOOD PRESSURE: 70 MMHG | OXYGEN SATURATION: 99 % | HEIGHT: 67 IN | BODY MASS INDEX: 29.51 KG/M2 | TEMPERATURE: 96.6 F | HEART RATE: 97 BPM | SYSTOLIC BLOOD PRESSURE: 122 MMHG

## 2025-07-22 DIAGNOSIS — E66.3 OVERWEIGHT: ICD-10-CM

## 2025-07-22 DIAGNOSIS — E88.810 METABOLIC SYNDROME: ICD-10-CM

## 2025-07-22 DIAGNOSIS — Z13.6 SCREENING FOR CARDIOVASCULAR CONDITION: ICD-10-CM

## 2025-07-22 DIAGNOSIS — L30.4 INTERTRIGO: ICD-10-CM

## 2025-07-22 DIAGNOSIS — E55.9 VITAMIN D DEFICIENCY: ICD-10-CM

## 2025-07-22 DIAGNOSIS — Z86.39 HISTORY OF OBESITY: ICD-10-CM

## 2025-07-22 DIAGNOSIS — L70.0 ACNE VULGARIS: ICD-10-CM

## 2025-07-22 DIAGNOSIS — Z00.00 ANNUAL PHYSICAL EXAM: Primary | ICD-10-CM

## 2025-07-22 DIAGNOSIS — E78.6 LOW HDL (UNDER 40): ICD-10-CM

## 2025-07-22 DIAGNOSIS — L98.7 EXCESS SKIN OF ABDOMEN: ICD-10-CM

## 2025-07-22 PROCEDURE — 99214 OFFICE O/P EST MOD 30 MIN: CPT | Performed by: STUDENT IN AN ORGANIZED HEALTH CARE EDUCATION/TRAINING PROGRAM

## 2025-07-22 PROCEDURE — 1036F TOBACCO NON-USER: CPT | Performed by: STUDENT IN AN ORGANIZED HEALTH CARE EDUCATION/TRAINING PROGRAM

## 2025-07-22 PROCEDURE — 3008F BODY MASS INDEX DOCD: CPT | Performed by: STUDENT IN AN ORGANIZED HEALTH CARE EDUCATION/TRAINING PROGRAM

## 2025-07-22 PROCEDURE — 99395 PREV VISIT EST AGE 18-39: CPT | Performed by: STUDENT IN AN ORGANIZED HEALTH CARE EDUCATION/TRAINING PROGRAM

## 2025-07-22 PROCEDURE — 3074F SYST BP LT 130 MM HG: CPT | Performed by: STUDENT IN AN ORGANIZED HEALTH CARE EDUCATION/TRAINING PROGRAM

## 2025-07-22 PROCEDURE — 3078F DIAST BP <80 MM HG: CPT | Performed by: STUDENT IN AN ORGANIZED HEALTH CARE EDUCATION/TRAINING PROGRAM

## 2025-07-22 RX ORDER — PHENTERMINE HYDROCHLORIDE 37.5 MG/1
37.5 CAPSULE ORAL
Qty: 30 CAPSULE | Refills: 0 | Status: SHIPPED | OUTPATIENT
Start: 2025-07-24 | End: 2025-07-24

## 2025-07-22 RX ORDER — SPIRONOLACTONE 50 MG/1
50 TABLET, FILM COATED ORAL DAILY
Qty: 90 TABLET | Refills: 1 | Status: SHIPPED | OUTPATIENT
Start: 2025-07-22 | End: 2026-01-18

## 2025-07-22 ASSESSMENT — PATIENT HEALTH QUESTIONNAIRE - PHQ9
7. TROUBLE CONCENTRATING ON THINGS, SUCH AS READING THE NEWSPAPER OR WATCHING TELEVISION: SEVERAL DAYS
1. LITTLE INTEREST OR PLEASURE IN DOING THINGS: NOT AT ALL
8. MOVING OR SPEAKING SO SLOWLY THAT OTHER PEOPLE COULD HAVE NOTICED. OR THE OPPOSITE, BEING SO FIGETY OR RESTLESS THAT YOU HAVE BEEN MOVING AROUND A LOT MORE THAN USUAL: NOT AT ALL
SUM OF ALL RESPONSES TO PHQ9 QUESTIONS 1 AND 2: 0
2. FEELING DOWN, DEPRESSED OR HOPELESS: NOT AT ALL
5. POOR APPETITE OR OVEREATING: SEVERAL DAYS
4. FEELING TIRED OR HAVING LITTLE ENERGY: NEARLY EVERY DAY
SUM OF ALL RESPONSES TO PHQ QUESTIONS 1-9: 6
6. FEELING BAD ABOUT YOURSELF - OR THAT YOU ARE A FAILURE OR HAVE LET YOURSELF OR YOUR FAMILY DOWN: SEVERAL DAYS
9. THOUGHTS THAT YOU WOULD BE BETTER OFF DEAD, OR OF HURTING YOURSELF: NOT AT ALL
3. TROUBLE FALLING OR STAYING ASLEEP OR SLEEPING TOO MUCH: NOT AT ALL

## 2025-07-22 ASSESSMENT — ANXIETY QUESTIONNAIRES
GAD7 TOTAL SCORE: 8
1. FEELING NERVOUS, ANXIOUS, OR ON EDGE: SEVERAL DAYS
3. WORRYING TOO MUCH ABOUT DIFFERENT THINGS: SEVERAL DAYS
IF YOU CHECKED OFF ANY PROBLEMS ON THIS QUESTIONNAIRE, HOW DIFFICULT HAVE THESE PROBLEMS MADE IT FOR YOU TO DO YOUR WORK, TAKE CARE OF THINGS AT HOME, OR GET ALONG WITH OTHER PEOPLE: NOT DIFFICULT AT ALL
4. TROUBLE RELAXING: NEARLY EVERY DAY
5. BEING SO RESTLESS THAT IT IS HARD TO SIT STILL: SEVERAL DAYS
2. NOT BEING ABLE TO STOP OR CONTROL WORRYING: SEVERAL DAYS
7. FEELING AFRAID AS IF SOMETHING AWFUL MIGHT HAPPEN: SEVERAL DAYS
6. BECOMING EASILY ANNOYED OR IRRITABLE: NOT AT ALL

## 2025-07-22 ASSESSMENT — PAIN SCALES - GENERAL: PAINLEVEL_OUTOF10: 0-NO PAIN

## 2025-07-22 NOTE — PROGRESS NOTES
Andera Hoover is a 34 y.o. female who presents for med follow up.    HPI:    This is a 34-year-old female presenting for Yearly Physical and weight management follow-up.    Preventative Health Care:    Andrea Hoover is a 34 y.o. female who presents for med follow up.    HPI:      PREVENTATIVE HEALTH CARE:    Immunizations:  COVID:  utd  Flu:  utd  TDaP:  utd  Pneumonia:  not currently indicated.      Immunization History   Administered Date(s) Administered    COVID-19, mRNA, LNP-S, PF, 30 mcg/0.3 mL dose 02/26/2021, 03/26/2021, 12/13/2021    Flu vaccine, quadrivalent, no egg protein, age 6 month or greater (FLUCELVAX) 11/18/2018    Flu vaccine, trivalent, preservative free, age 6 months and greater (Fluarix/Fluzone/Flulaval) 09/10/2024    Hepatitis B vaccine, 18yrs and older(HEPLISAV) 05/28/2025, 06/27/2025    Influenza, injectable, quadrivalent 10/26/2018, 09/17/2019, 01/20/2023, 09/27/2023    Moderna COVID-19 vaccine, 12 years and older (50mcg/0.5mL)(Spikevax) 09/10/2024    Tdap vaccine, age 7 year and older (BOOSTRIX, ADACEL) 05/28/2025       Screenings:  HIV/HCV:  negative x2 1/20/2023 - utd  Pap:  11/28/2018, wnl, HPV negative - DUE  Mammogram:  not currently indicated.  Colonoscopy:  not currently indicated.      Acne:  Recent worsening despite use of topical clindamycin and tretinoin daily as prescribed.  She is also using over-the-counter benzoyl peroxide which has not been helpful.  Feels that her acne is mostly hormonal.    Weight Management:    She has done very well in her weight loss journey, she is now down 171 pounds from her starting weight!  Medical weight management is currently managed with Ozempic 1 mg weekly, phentermine 37.5 mg daily she continues to do well with both of these medications without significant side effects.  Denies any GI side effects with the Ozempic or phentermine.  Also denies any chest pain/pressure/palpitations, constipation, or significant  dry mouth with the phentermine.    Due to her significant weight loss, she continues to have skin breakdown/irritation on both her upper and lower abdomen due to excess skin and irritation of skin folds.  In addition to excess skin of abdomen, she also has excess skin of both thighs and upper arms.We have been treating this with nystatin powder, however this continues to be an ongoing/recurrent issue that is impacting her ability to move/exercise, perform her ADLs       1/20/2023 2/2/2023 3/2/2023 3/10/2023 4/3/2023   Vitals        Weight (lb) 359 (H)  357 (H)  344 (H)  339 (H)  333 (H)    BMI 56.23 kg/m2  55.91 kg/m2  53.88 kg/m2  53.49 kg/m2  52.55 kg/m2       4/25/2023 4/28/2023 5/2/2023 5/24/2023 6/6/2023   Vitals        Weight (lb) 322  321  326  311  306.8    BMI 50.81 kg/m2  50.65 kg/m2  51.44 kg/m2  49.08 kg/m2  48.41 kg/m2       6/27/2023 6/29/2023 6/30/2023 8/4/2023 9/1/2023   Vitals        Weight (lb) 296  293  294.2  292  275    BMI 46.71 kg/m2  46.23 kg/m2  46.42 kg/m2  46.08 kg/m2  43.39 kg/m2       9/27/2023 11/9/2023 11/21/2023 12/21/2023 3/5/2024   Vitals        Weight (lb) 267  254  258  245  233    Weight (lb)   258      BMI 42.13 kg/m2  40.08 kg/m2  40.71 kg/m2  39.54 kg/m2  37.61 kg/m2    BMI   40.71 kg/m2         5/6/2024 6/28/2024 10/3/2024 1/14/2025 4/22/2025   Vitals        Weight (lb) 219  216  228  199.4  188    BMI 35.35 kg/m2  34.86 kg/m2  36.8 kg/m2  32.18 kg/m2  29.44 kg/m2       6/9/2025 7/22/2025   Vitals     Weight (lb) 196  188    BMI 30.7 kg/m2  29.44 kg/m2       Legend:  (H) High      Lab Results   Component Value Date    WBC 8.3 07/22/2025    HGB 13.0 07/22/2025    HCT 39.8 07/22/2025     07/22/2025    CHOL 140 07/22/2025    TRIG 47 07/22/2025    HDL 58 07/22/2025    ALT 17 07/22/2025    AST 23 07/22/2025     07/22/2025    K 4.1 07/22/2025     07/22/2025    CREATININE 0.84 07/22/2025    BUN 20 07/22/2025    CO2 26 07/22/2025    TSH 0.62 07/22/2025    INR 1.0  03/28/2023    HGBA1C 5.0 07/22/2025      OARRS:  Shirley Villar MD on 7/24/2025  6:09 AM - last phentermine fill on 6/27/2025 for #30  I have personally reviewed the OARRS report for Sruthi Hoover. I have considered the risks of abuse, dependence, addiction and diversion and I believe that it is clinically appropriate for Sruthi Hoover to be prescribed this medication    Is the patient prescribed a combination of a benzodiazepine and opioid?  No    Last Urine Drug Screen / ordered today: No  No results found for this or any previous visit (from the past 8760 hours).  N/A    Controlled Substance Agreement:  Date of the Last Agreement: 1/14/2025 with TATE Prather- CNP - DUE FOR RENEWAL WITH ME  Reviewed Controlled Substance Agreement including but not limited to the benefits, risks, and alternatives to treatment with a Controlled Substance medication(s).    Anorexiants:   What is the patient's goal of therapy?  Weight loss/management  Is this being achieved with current treatment?  Yes    Patient has demonstrated continued efforts to lose weight, is dedicated to the treatment program and the response to treatment. and I have assessed for the presence or absence of contraindications, adverse effects, and indicators of possible substance abuse that would necessitate cessation of treatment utilizing controlled substance.    Activities of Daily Living:   Is your overall impression that this patient is benefiting (symptom reduction outweighs side effects) from anorexiants therapy? Yes     1. Physical Functioning: Better  2. Family Relationship: Better  3. Social Relationship: Better  4. Mood: Better  5. Sleep Patterns: Same  6. Overall Function: Better    Excess Skin:  Plastic surgery for evaluation of this issue on 6/9/2025.  Excess skin has been causing rash and impairment of ADLs and exercise.  Scheduled for dermatolipectomy of abdomen with Stephan Schmidt MD in Plastic surgery    Thighs,  "back, stomach, arms.  Still not comfortable.  Impacting her mental health. Rashes on upper and lower stomach.     Hyperlipidemia:  Managed with lifestyle measures.  Interval improvement in HDL in recent years with weight loss and increased physical activity.    Lab Results   Component Value Date    CHOL 140 07/22/2025    CHOL 139 05/06/2024    CHOL 148 01/20/2023     Lab Results   Component Value Date    HDL 58 07/22/2025    HDL 55.0 05/06/2024    HDL 42 (L) 01/20/2023     Lab Results   Component Value Date    LDLCALC 69 07/22/2025    LDLCALC 74 05/06/2024    LDLCALC 90 01/20/2023     Lab Results   Component Value Date    TRIG 47 07/22/2025    TRIG 51 05/06/2024    TRIG 78 01/20/2023     No components found for: \"CHOLHDL\"      ROS:   Review of systems is essentially negative for all systems except for any identified issues in HPI above.    Objective     /70   Pulse 97   Temp 35.9 °C (96.6 °F)   Ht 1.702 m (5' 7\")   Wt 85.3 kg (188 lb)   SpO2 99%   BMI 29.44 kg/m²      PHYSICAL EXAM    GENERAL  Well-appearing, pleasant and cooperative.  No acute distress.    HEENT  HEAD:   Normocephalic.  Atraumatic.  EYES:  PERRLA.  No scleral icterus or conjunctival injection.  NECK:  No adenopathy.  No palpable thyroid enlargement or nodules.    THROAT:  Moist oropharynx without tonsillar enlargement or exudates.    SKIN:    Comedonal acne on face, highest density on forehead and chin.    LUNGS:    Clear to auscultation bilaterally.  No wheezes, rales, rhonchi.    CARDIAC:  Regular rate and rhythm.  Normal S1S2.  No murmurs/rubs/gallops.    ABDOMEN:  Dermatitis/skin breakdown of skin folds at upper and lower abdomen/pannus.  Abdomen is otherwise soft, non-tender, non-distended.  No hepatosplenomegaly.  Normoactive bowel sounds.    MUSCULOSKELETAL:  No gross abnormalities.      EXTREMITIES:  Excess skin of thighs, most notable medially. LE edema or cyanosis.      NEURO           Alert and oriented x3. No focal " deficits.    PSYCH:          Affect appropriate.           Assessment/Plan   Problem List Items Addressed This Visit       Vitamin D deficiency    Relevant Orders    Vitamin D 25-Hydroxy,Total (for eval of Vitamin D levels) (Completed)    Metabolic syndrome    Relevant Medications    phentermine 37.5 mg capsule    Other Relevant Orders    Hemoglobin A1c (Completed)    Low HDL (under 40)    Relevant Medications    phentermine 37.5 mg capsule    Acne vulgaris    Ongoing/worsening despite use of topical clindamycin, tretinoin, and OTC benzoyl peroxide wash.  Suspect hormonal component, She is agreeable to starting spironolactone in addition to the above measures.  New prescription sent to pharmacy.         Relevant Medications    spironolactone (Aldactone) 50 mg tablet    Excess skin of abdomen    Direct result of her weight loss which she has been successfully maintaining over the course of several years.  I do not believe that this can be fully managed without surgical intervention.  Excess skin is resulting in rash/intertrigo of skin folds that is being treated currently with nystatin.  Excess skin is also impairing her ability to move/exercise and perform routine tasks throughout her day.  I encouraged her to continue follow-up with plastic surgery for surgical removal of this excess skin which is what I feel will ultimately be needed for resolution of this issue.         Intertrigo    Candidal intertrigo noted on upper and lower abdomen today in folds of excess skin as noted above.  We will continue treating nystatin, however as this issue has been ongoing I believe she would benefit from surgical skin removal to help with control of this issue.          Other Visit Diagnoses         Annual physical exam    -  Primary    Relevant Orders    TSH with reflex to Free T4 if abnormal (Completed)    Lipid Panel (Completed)    CBC (Completed)    Comprehensive Metabolic Panel (Completed)      Screening for cardiovascular  condition        Relevant Orders    Lipid Panel (Completed)      History of obesity        Relevant Medications    phentermine 37.5 mg capsule      Overweight        Relevant Medications    phentermine 37.5 mg capsule          Patient Instructions   Thank you for coming to see me today.    Go to the lab for fasting blood work, we will notify you with all results.    Oral acne medication, spironolactone 50 mg daily, sent to pharmacy.  Take daily as prescribed.  - Okay to also continue using topical acne medications.    Phentermine refill sent to pharmacy for pickup on 7/24/2025 or after.    Follow-up with me in 2 to 3 months for weight management/medication follow-up.  Sooner if needed.     Counseling:   Medication education:    Education: The patient is counseled regarding potential side effects of all new medications.    Understanding:  Patient expressed understanding.    Adherence:  No barriers to adherence identified.      Shirley Villar MD

## 2025-07-22 NOTE — PATIENT INSTRUCTIONS
Thank you for coming to see me today.    Go to the lab for fasting blood work, we will notify you with all results.    Oral acne medication, spironolactone 50 mg daily, sent to pharmacy.  Take daily as prescribed.  - Okay to also continue using topical acne medications.    Phentermine refill sent to pharmacy for pickup on 7/24/2025 or after.    Follow-up with me in 2 to 3 months for weight management/medication follow-up.  Sooner if needed.

## 2025-07-23 LAB
25(OH)D3+25(OH)D2 SERPL-MCNC: 71 NG/ML (ref 30–100)
ALBUMIN SERPL-MCNC: 4.5 G/DL (ref 3.6–5.1)
ALP SERPL-CCNC: 58 U/L (ref 31–125)
ALT SERPL-CCNC: 17 U/L (ref 6–29)
ANION GAP SERPL CALCULATED.4IONS-SCNC: 10 MMOL/L (CALC) (ref 7–17)
AST SERPL-CCNC: 23 U/L (ref 10–30)
BILIRUB SERPL-MCNC: 0.6 MG/DL (ref 0.2–1.2)
BUN SERPL-MCNC: 20 MG/DL (ref 7–25)
CALCIUM SERPL-MCNC: 9.5 MG/DL (ref 8.6–10.2)
CHLORIDE SERPL-SCNC: 103 MMOL/L (ref 98–110)
CHOLEST SERPL-MCNC: 140 MG/DL
CHOLEST/HDLC SERPL: 2.4 (CALC)
CO2 SERPL-SCNC: 26 MMOL/L (ref 20–32)
CREAT SERPL-MCNC: 0.84 MG/DL (ref 0.5–0.97)
EGFRCR SERPLBLD CKD-EPI 2021: 92 ML/MIN/1.73M2
ERYTHROCYTE [DISTWIDTH] IN BLOOD BY AUTOMATED COUNT: 12 % (ref 11–15)
EST. AVERAGE GLUCOSE BLD GHB EST-MCNC: 97 MG/DL
EST. AVERAGE GLUCOSE BLD GHB EST-SCNC: 5.4 MMOL/L
GLUCOSE SERPL-MCNC: 68 MG/DL (ref 65–99)
HBA1C MFR BLD: 5 %
HCT VFR BLD AUTO: 39.8 % (ref 35–45)
HDLC SERPL-MCNC: 58 MG/DL
HGB BLD-MCNC: 13 G/DL (ref 11.7–15.5)
LDLC SERPL CALC-MCNC: 69 MG/DL (CALC)
MCH RBC QN AUTO: 30.4 PG (ref 27–33)
MCHC RBC AUTO-ENTMCNC: 32.7 G/DL (ref 32–36)
MCV RBC AUTO: 93 FL (ref 80–100)
NONHDLC SERPL-MCNC: 82 MG/DL (CALC)
PLATELET # BLD AUTO: 311 THOUSAND/UL (ref 140–400)
PMV BLD REES-ECKER: 10.2 FL (ref 7.5–12.5)
POTASSIUM SERPL-SCNC: 4.1 MMOL/L (ref 3.5–5.3)
PROT SERPL-MCNC: 6.8 G/DL (ref 6.1–8.1)
RBC # BLD AUTO: 4.28 MILLION/UL (ref 3.8–5.1)
SODIUM SERPL-SCNC: 139 MMOL/L (ref 135–146)
TRIGL SERPL-MCNC: 47 MG/DL
TSH SERPL-ACNC: 0.62 MIU/L
WBC # BLD AUTO: 8.3 THOUSAND/UL (ref 3.8–10.8)

## 2025-07-24 RX ORDER — PHENTERMINE HYDROCHLORIDE 37.5 MG/1
37.5 CAPSULE ORAL
Qty: 30 CAPSULE | Refills: 0 | Status: SHIPPED | OUTPATIENT
Start: 2025-07-24 | End: 2025-08-23

## 2025-07-24 NOTE — ASSESSMENT & PLAN NOTE
Candidal intertrigo noted on upper and lower abdomen today in folds of excess skin as noted above.  We will continue treating nystatin, however as this issue has been ongoing I believe she would benefit from surgical skin removal to help with control of this issue.

## 2025-07-24 NOTE — ASSESSMENT & PLAN NOTE
Direct result of her weight loss which she has been successfully maintaining over the course of several years.  I do not believe that this can be fully managed without surgical intervention.  Excess skin is resulting in rash/intertrigo of skin folds that is being treated currently with nystatin.  Excess skin is also impairing her ability to move/exercise and perform routine tasks throughout her day.  I encouraged her to continue follow-up with plastic surgery for surgical removal of this excess skin which is what I feel will ultimately be needed for resolution of this issue.

## 2025-07-24 NOTE — ASSESSMENT & PLAN NOTE
Ongoing/worsening despite use of topical clindamycin, tretinoin, and OTC benzoyl peroxide wash.  Suspect hormonal component, She is agreeable to starting spironolactone in addition to the above measures.  New prescription sent to pharmacy.

## 2025-08-15 DIAGNOSIS — L70.0 ACNE VULGARIS: ICD-10-CM

## 2025-08-15 RX ORDER — CLINDAMYCIN PHOSPHATE 10 MG/G
GEL TOPICAL DAILY
Qty: 60 G | Refills: 1 | Status: SHIPPED | OUTPATIENT
Start: 2025-08-15